# Patient Record
Sex: FEMALE | Race: BLACK OR AFRICAN AMERICAN | NOT HISPANIC OR LATINO | Employment: OTHER | URBAN - METROPOLITAN AREA
[De-identification: names, ages, dates, MRNs, and addresses within clinical notes are randomized per-mention and may not be internally consistent; named-entity substitution may affect disease eponyms.]

---

## 2017-01-09 ENCOUNTER — HOSPITAL ENCOUNTER (EMERGENCY)
Facility: CLINIC | Age: 42
Discharge: HOME | End: 2017-01-09
Attending: EMERGENCY MEDICINE | Admitting: EMERGENCY MEDICINE
Payer: COMMERCIAL

## 2017-01-09 ENCOUNTER — APPOINTMENT (OUTPATIENT)
Dept: GENERAL RADIOLOGY | Facility: CLINIC | Age: 42
End: 2017-01-09
Attending: EMERGENCY MEDICINE
Payer: COMMERCIAL

## 2017-01-09 VITALS
OXYGEN SATURATION: 100 % | TEMPERATURE: 98.1 F | DIASTOLIC BLOOD PRESSURE: 90 MMHG | SYSTOLIC BLOOD PRESSURE: 120 MMHG | RESPIRATION RATE: 18 BRPM | WEIGHT: 223 LBS | BODY MASS INDEX: 35.46 KG/M2

## 2017-01-09 DIAGNOSIS — M79.671 RIGHT FOOT PAIN: ICD-10-CM

## 2017-01-09 DIAGNOSIS — M54.50 MIDLINE LOW BACK PAIN WITHOUT SCIATICA, UNSPECIFIED CHRONICITY: ICD-10-CM

## 2017-01-09 PROCEDURE — 73630 X-RAY EXAM OF FOOT: CPT | Mod: RT

## 2017-01-09 PROCEDURE — 99284 EMERGENCY DEPT VISIT MOD MDM: CPT | Mod: Z6 | Performed by: EMERGENCY MEDICINE

## 2017-01-09 PROCEDURE — 99283 EMERGENCY DEPT VISIT LOW MDM: CPT | Performed by: EMERGENCY MEDICINE

## 2017-01-09 RX ORDER — SALSALATE 500 MG/1
500 TABLET, FILM COATED ORAL EVERY 8 HOURS
Qty: 30 TABLET | Refills: 0 | Status: SHIPPED | OUTPATIENT
Start: 2017-01-09 | End: 2017-09-29

## 2017-01-09 ASSESSMENT — ENCOUNTER SYMPTOMS
ABDOMINAL PAIN: 0
COLOR CHANGE: 0
BACK PAIN: 1
CONFUSION: 0
SHORTNESS OF BREATH: 0
DIFFICULTY URINATING: 0
EYE REDNESS: 0
ARTHRALGIAS: 0
FEVER: 0
HEADACHES: 0
NECK STIFFNESS: 0

## 2017-01-09 NOTE — ED AVS SNAPSHOT
[facility], Emergency Department    [address]    [facility] [address]    Phone:  [phone]    Fax:  [phone]                                       Sadi Bedoya   MRN: [id number]    Department:  [facility], [place], Emergency Department   Date of Visit:  1/9/2017           Patient Information     Date Of Birth          1975        Your diagnoses for this visit were:     Midline low back pain without sciatica, unspecified chronicity     Right foot pain        You were seen by Satinder Hunt MD.      Follow-up Information     Follow up with Emmie King MD.    Specialty:  Family Practice    Contact information:    [address] [id number] [phone]          Discharge Instructions         Neck/Back Pain: General    Both neck and back pain are usually caused by injury to the muscles or ligaments of the spine. Sometimes the disks that separate each bone of the spine may cause pain by pressing on a nearby nerve. Back and neck pain may appear after a sudden twisting/bending force (such as in a car accident), or sometimes after a simple awkward movement. In either case, muscle spasm is often present and adds to the pain.  Acute neck and back pain usually gets better in 1 to 2 weeks. Pain related to disk disease, arthritis in the spinal joints or spinal stenosis (narrowing of the spinal canal) can become chronic and last for months or years.  Back and neck pain are common problems. Most people feel better in 1 or 2 weeks, and most of the rest in 1 to 2 months. Most people can remain active.  Symptoms  People experience and describe pain differently.    Pain can be sharp, stabbing, shooting, aching, cramping, or burning    Movement, standing, bending, lifting, sitting, or walking may worsen the pain    Pain can be localized to one spot or area, or it can be more generalized    Pain can spread or radiate upwards, downwards, to the front, or go  "down your arms    Muscle spasm may occur.  Cause  Most of the time \"mechanical problems\" with the muscles or spine cause the pain. it is usually caused by an injury, whether known or not, to the muscles or ligaments. While illnesses can cause back pain, it is usually not caused by a serious illness. Pain is usually related to physical activity, whether sports, exercise, work, or normal activity. Sometimes it can occur without an identifiable cause. This can happen simply by stretching or moving wrong, without noting pain at the time. Other causes include:    Overexertion, lifting, pushing, pulling incorrectly or too aggressively.    Sudden twisting, bending or stretching from an accident (car or fall), or accidental movement.    Poor posture    Poor conditioning, lack of regular exercise    Spinal disc disease or arthritis    Stress    Pregnancy, or illness like appendicitis, bladder or kidney infection, pelvic infections   Home care    For neck pain: Use a comfortable pillow that supports the head and keeps the spine in a neutral position. The position of the head should not be tilted forward or backward.    When in bed, try to find a position of comfort. A firm mattress is best. Try lying flat on your back with pillows under your knees. You can also try lying on your side with your knees bent up towards your chest and a pillow between your knees.    At first, do not try to stretch out the sore spots. If there is a strain, it is not like the good soreness you get after exercising without an injury. In this case, stretching may make it worse.    Avoid prolonged sitting, long car rides or travel. This puts more stress on the lower back than standing or walking.    During the first 24 to 72 hours after an injury, apply an ice pack to the painful area for 20 minutes and then remove it for 20 minutes over a period of 60 to 90 minutes or several times a day. As a safety precaution, do not use a heating pad at bedtime. " Sleeping with a heating pad can lead to skin burns or tissue damage.    Ice and heat therapies can be alternated. Talk with your health care provider about the best treatment for your back or neck pain.    Therapeutic massage can help relax the back and neck muscles without stretching them.    Be aware of safe lifting methods and do not lift anything over 15 pounds until all the pain is gone.  Medications  Talk to your health care provider before using medications, especially if you have other medical problems or are taking other medicines.    You may use acetaminophen (such as Tylenol) or ibuprofen (such as Advil or Motrin) to control pain, unless another pain medicine was prescribed. If you have chronic conditions like diabetes, liver or kidney disease, stomach ulcers,  gastrointestinal bleeding, or are taking blood thinner medications.    Be careful if you are given pain medicines, narcotics, or medication for muscle spasm. They can cause drowsiness, and can affect your coordination, reflexes, and judgment. Do not drive or operate heavy machinery.  Follow-up care  Follow up with your health care provider if your symptoms do not start to improve after one week. Physical therapy or further tests may be needed.  If X-rays were taken, they will be reviewed by a radiologist. You will be notified of any new findings that may affect your care.  Call 911  Seek emergency medical care if any of the following occur:    Trouble breathing    Confusion    Very drowsy or trouble awakening    Fainting or loss of consciousness    Rapid or very slow heart rate    Loss of bowel or bladder control  When to seek medical care  Get prompt medical attention if any of the following occur:    Pain becomes worse or spreads into your arms or legs    Weakness, numbness or pain in one or both arms or legs    Numbness in the groin area    Difficulty walking    Fever of 100.4 F (38 C) or higher, or as directed by your healthcare provider     0553-0610 [employer]. [address] [place] [address], [place] [address]. All rights reserved. This information is not intended as a substitute for professional medical care. Always follow your healthcare professional's instructions.      Use metatarsal pads in your widest pair of shoes    24 Hour Appointment Hotline       To make an appointment at any [facility], call [phone] ([phone]). If you don't have a family doctor or clinic, we will help you find one. [facility] are conveniently located to serve the needs of you and your family.             Review of your medicines      Our records show that you are taking the medicines listed below. If these are incorrect, please call your family doctor or clinic.        Dose / Directions Last dose taken    albuterol 108 (90 BASE) MCG/ACT Inhaler   Commonly known as:  albuterol   Dose:  2 puff   Quantity:  1 Inhaler        Inhale 2 puffs into the lungs every 4 hours as needed for shortness of breath / dyspnea   Refills:  0        Benzocaine 4 MG Lozg   Dose:  1 lozenge   Quantity:  20 lozenge        Take 1 lozenge by mouth 4 times daily as needed   Refills:  0        letrozole 2.5 MG tablet   Commonly known as:  FEMARA   Quantity:  10 tablet        Take 2 tablets by mouth days 3-7 of cycle   Refills:  3        moxifloxacin 400 MG tablet   Commonly known as:  AVELOX   Dose:  400 mg   Quantity:  7 tablet        Take 1 tablet (400 mg) by mouth daily   Refills:  0        * order for DME   Quantity:  2 Device        Equipment being ordered: TEDS stockings, thigh length.   Refills:  0        * order for DME   Quantity:  1 Device        Equipment being ordered: TEDs stockings thigh high length.   Refills:  1        * order for DME   Quantity:  1 Device        Equipment being ordered: EMIR stockings thigh high.   Refills:  1        * order for DME   Quantity:  2 Device        Equipment being ordered: EMIR stockings. Thigh high.   Refills:  1        *  "order for DME   Quantity:  1 Package        Equipment being ordered: compression stockings, bilateral   Refills:  1        * order for DME   Quantity:  1 Units        Thigh high graduated compression stockings with hip attachment. Class 2.   Refills:  3        prenatal multivitamin  plus iron 27-0.8 MG Tabs per tablet   Dose:  1 tablet   Quantity:  100 tablet        Take 1 tablet by mouth daily   Refills:  3        TYLENOL PO        Refills:  0        vitamin D 38695 UNIT capsule   Commonly known as:  ERGOCALCIFEROL   Dose:  89495 Units        Take 50,000 Units by mouth daily   Refills:  0        * Notice:  This list has 6 medication(s) that are the same as other medications prescribed for you. Read the directions carefully, and ask your doctor or other care provider to review them with you.            Procedures and tests performed during your visit     Foot  XR, G/E 3 views, right      Orders Needing Specimen Collection     None      Pending Results     No orders found from 1/8/2017 to 1/10/2017.            Pending Culture Results     No orders found from 1/8/2017 to 1/10/2017.            Thank you for choosing [facility]       Thank you for choosing [facility] for your care. Our goal is always to provide you with excellent care. Hearing back from our patients is one way we can continue to improve our services. Please take a few minutes to complete the written survey that you may receive in the mail after you visit with us. Thank you!        "[employer]"hart Information     [facility] lets you send messages to your doctor, view your test results, renew your prescriptions, schedule appointments and more. To sign up, go to [email or web address] . Click on \"Log in\" on the left side of the screen, which will take you to the Welcome page. Then click on \"Sign up Now\" on the right side of the page.     You will be asked to enter the access code listed below, as well as some personal information. Please follow the directions to create " your username and password.     Your access code is: [email or web address]  Expires: 2017 11:34 AM     Your access code will  in 90 days. If you need help or a new code, please call your [place] clinic or [phone].        Care EveryWhere ID     This is your Care EveryWhere ID. This could be used by other organizations to access your [place] medical records  [id number]        After Visit Summary       This is your record. Keep this with you and show to your community pharmacist(s) and doctor(s) at your next visit.

## 2017-01-09 NOTE — DISCHARGE INSTRUCTIONS
"  Neck/Back Pain: General    Both neck and back pain are usually caused by injury to the muscles or ligaments of the spine. Sometimes the disks that separate each bone of the spine may cause pain by pressing on a nearby nerve. Back and neck pain may appear after a sudden twisting/bending force (such as in a car accident), or sometimes after a simple awkward movement. In either case, muscle spasm is often present and adds to the pain.  Acute neck and back pain usually gets better in 1 to 2 weeks. Pain related to disk disease, arthritis in the spinal joints or spinal stenosis (narrowing of the spinal canal) can become chronic and last for months or years.  Back and neck pain are common problems. Most people feel better in 1 or 2 weeks, and most of the rest in 1 to 2 months. Most people can remain active.  Symptoms  People experience and describe pain differently.    Pain can be sharp, stabbing, shooting, aching, cramping, or burning    Movement, standing, bending, lifting, sitting, or walking may worsen the pain    Pain can be localized to one spot or area, or it can be more generalized    Pain can spread or radiate upwards, downwards, to the front, or go down your arms    Muscle spasm may occur.  Cause  Most of the time \"mechanical problems\" with the muscles or spine cause the pain. it is usually caused by an injury, whether known or not, to the muscles or ligaments. While illnesses can cause back pain, it is usually not caused by a serious illness. Pain is usually related to physical activity, whether sports, exercise, work, or normal activity. Sometimes it can occur without an identifiable cause. This can happen simply by stretching or moving wrong, without noting pain at the time. Other causes include:    Overexertion, lifting, pushing, pulling incorrectly or too aggressively.    Sudden twisting, bending or stretching from an accident (car or fall), or accidental movement.    Poor posture    Poor conditioning, " lack of regular exercise    Spinal disc disease or arthritis    Stress    Pregnancy, or illness like appendicitis, bladder or kidney infection, pelvic infections   Home care    For neck pain: Use a comfortable pillow that supports the head and keeps the spine in a neutral position. The position of the head should not be tilted forward or backward.    When in bed, try to find a position of comfort. A firm mattress is best. Try lying flat on your back with pillows under your knees. You can also try lying on your side with your knees bent up towards your chest and a pillow between your knees.    At first, do not try to stretch out the sore spots. If there is a strain, it is not like the good soreness you get after exercising without an injury. In this case, stretching may make it worse.    Avoid prolonged sitting, long car rides or travel. This puts more stress on the lower back than standing or walking.    During the first 24 to 72 hours after an injury, apply an ice pack to the painful area for 20 minutes and then remove it for 20 minutes over a period of 60 to 90 minutes or several times a day. As a safety precaution, do not use a heating pad at bedtime. Sleeping with a heating pad can lead to skin burns or tissue damage.    Ice and heat therapies can be alternated. Talk with your health care provider about the best treatment for your back or neck pain.    Therapeutic massage can help relax the back and neck muscles without stretching them.    Be aware of safe lifting methods and do not lift anything over 15 pounds until all the pain is gone.  Medications  Talk to your health care provider before using medications, especially if you have other medical problems or are taking other medicines.    You may use acetaminophen (such as Tylenol) or ibuprofen (such as Advil or Motrin) to control pain, unless another pain medicine was prescribed. If you have chronic conditions like diabetes, liver or kidney disease, stomach  ulcers,  gastrointestinal bleeding, or are taking blood thinner medications.    Be careful if you are given pain medicines, narcotics, or medication for muscle spasm. They can cause drowsiness, and can affect your coordination, reflexes, and judgment. Do not drive or operate heavy machinery.  Follow-up care  Follow up with your health care provider if your symptoms do not start to improve after one week. Physical therapy or further tests may be needed.  If X-rays were taken, they will be reviewed by a radiologist. You will be notified of any new findings that may affect your care.  Call 911  Seek emergency medical care if any of the following occur:    Trouble breathing    Confusion    Very drowsy or trouble awakening    Fainting or loss of consciousness    Rapid or very slow heart rate    Loss of bowel or bladder control  When to seek medical care  Get prompt medical attention if any of the following occur:    Pain becomes worse or spreads into your arms or legs    Weakness, numbness or pain in one or both arms or legs    Numbness in the groin area    Difficulty walking    Fever of 100.4 F (38 C) or higher, or as directed by your healthcare provider    8293-1762 [employer]. [address] [place] [address]. All rights reserved. This information is not intended as a substitute for professional medical care. Always follow your healthcare professional's instructions.      Use metatarsal pads in your widest pair of shoes

## 2017-01-09 NOTE — ED AVS SNAPSHOT
[facility], [place], Emergency Department    [address]    [facility] [place] [address]    Phone:  [phone]    Fax:  [phone]                                       Sadi Bedoya   MRN: [id number]    Department:  [facility], Emergency Department   Date of Visit:  1/9/2017           After Visit Summary Signature Page     I have received my discharge instructions, and my questions have been answered. I have discussed any challenges I see with this plan with the nurse or doctor.    ..........................................................................................................................................  Patient/Patient Representative Signature      ..........................................................................................................................................  Patient Representative Print Name and Relationship to Patient    ..................................................               ................................................  Date                                            Time    ..........................................................................................................................................  Reviewed by Signature/Title    ...................................................              ..............................................  Date                                                            Time

## 2017-01-09 NOTE — ED PROVIDER NOTES
History     Chief Complaint   Patient presents with     Back Pain     mid back pain x 3 years, getting worse the last 3 weeks     Foot Pain     R top of foot pain x 1 week, denies trauma     The history is provided by the patient. The history is limited by a language barrier. A  was used ([place]).     Sadi Bedoya is a 42 year old female who presents to the Emergency Department with right foot pain and back pain that prevents her from sleeping. The patient reports that she has had back pain for approximately 3 years and states that she has had the dorsal foot pain for a week. She denies any injury or trauma. No redness on her foot. In regards to her back, she reports midline back pain and states that she was told she was losing cartilage between the bones. Patient states that she has tried Tylenol for her symptoms.     She reports that she would be unable to see her primary care doctor immediately which is why she came into the ER.     I have reviewed the Medications, Allergies, Past Medical and Surgical History, and Social History in the [facility] system.    PAST MEDICAL HISTORY  History reviewed. No pertinent past medical history.  PAST SURGICAL HISTORY  Past Surgical History   Procedure Laterality Date     Cholecystectomy       FAMILY HISTORY  Family History   Problem Relation Age of Onset     Liver Disease Mother      SOCIAL HISTORY  Social History   Substance Use Topics     Smoking status: Never Smoker      Smokeless tobacco: Never Used     Alcohol Use: No     MEDICATIONS  No current facility-administered medications for this encounter.     Current Outpatient Prescriptions   Medication     salsalate (DISALCID) 500 MG tablet     Acetaminophen (TYLENOL PO)     Prenatal Vit-Fe Fumarate-FA (PRENATAL MULTIVITAMIN  PLUS IRON) 27-0.8 MG TABS     vitamin D (ERGOCALCIFEROL) 35107 UNIT capsule     letrozole (FEMARA) 2.5 MG tablet     order for DME     order for DME     albuterol (ALBUTEROL) 108 (90  BASE) MCG/ACT inhaler     moxifloxacin (AVELOX) 400 MG tablet     Benzocaine 4 MG LOZG     ORDER FOR DME     ORDER FOR DME     ORDER FOR DME     ORDER FOR DME     ALLERGIES  Allergies   Allergen Reactions     No Known Drug Allergy         Review of Systems   Constitutional: Negative for fever.   HENT: Negative for congestion.    Eyes: Negative for redness.   Respiratory: Negative for shortness of breath.    Cardiovascular: Negative for chest pain.   Gastrointestinal: Negative for abdominal pain.   Genitourinary: Negative for difficulty urinating.   Musculoskeletal: Positive for back pain. Negative for arthralgias and neck stiffness.        Right foot pain   Skin: Negative for color change.   Neurological: Negative for headaches.   Psychiatric/Behavioral: Negative for confusion.   All other systems reviewed and are negative.      Physical Exam      Physical Exam   Constitutional: No distress.   HENT:   Head: Atraumatic.   Mouth/Throat: Oropharynx is clear and moist. No oropharyngeal exudate.   Eyes: Pupils are equal, round, and reactive to light. No scleral icterus.   Cardiovascular: Normal heart sounds and intact distal pulses.    Pulmonary/Chest: Breath sounds normal. No respiratory distress.   Abdominal: Soft. Bowel sounds are normal. There is no tenderness.   Musculoskeletal: She exhibits no edema.        Thoracic back: She exhibits tenderness.        Lumbar back: She exhibits tenderness and pain. She exhibits no swelling.        Back:         Feet:    Skin: Skin is warm. No rash noted. She is not diaphoretic.   Nursing note and vitals reviewed.      ED Course   Procedures        Results for orders placed or performed during the hospital encounter of 01/09/17 (from the past 24 hour(s))   Foot  XR, G/E 3 views, right    Narrative    XR FOOT RT G/E 3 VW 1/9/2017 1:34 PM    HISTORY: Metatarsal pain.    COMPARISON: None.    FINDINGS: No evidence of stress fracture or other specific osseous  abnormality to explain  pain in the metatarsals. There is very mild  degenerative change at the first MTP joint which is essentially within  normal limits for age. Small plantar heel spur as well.      Impression    IMPRESSION: No specific finding to explain metatarsal pain.    MACIE DIAZ MD             Labs Ordered and Resulted from Time of ED Arrival Up to the Time of Departure from the ED - No data to display    Assessments & Plan (with Medical Decision Making)   42-year-old female presents for evaluation of three-year history of low back pain as well as 2-4 week history of right foot pain.  She has tenderness in the bilateral paraspinous musculature of her lumbar back. She has no evidence of red flag symptoms such as weakness, paresthesias, incontinence or other.  Her exam is otherwise unremarkable. Her right foot reveals tenderness over the dorsum predominantly of the 2nd through 4th metatarsals without swelling. X-ray was obtained which was normal.  I suspect this is metatarsalgia and they have recommended metatarsal pads and  Wide fitting shoes as well as follow-up by primary physician.  I have reviewed the nursing notes.    I have reviewed the findings, diagnosis, plan and need for follow up with the patient.    New Prescriptions    SALSALATE (DISALCID) 500 MG TABLET    Take 1 tablet (500 mg) by mouth every 8 hours       Final diagnoses:   Midline low back pain without sciatica, unspecified chronicity   Right foot pain   I, Layo Díaz, am serving as a trained medical scribe to document services personally performed by Ja Hunt MD, based on the provider's statements to me.      Ja BATISTA MD, was physically present and have reviewed and verified the accuracy of this note documented by Layo Díaz.      1/9/2017   [facility], EMERGENCY DEPARTMENT      Macie Hunt MD  01/09/17 [id number]

## 2017-09-03 ENCOUNTER — HEALTH MAINTENANCE LETTER (OUTPATIENT)
Age: 42
End: 2017-09-03

## 2017-09-22 ENCOUNTER — TELEPHONE (OUTPATIENT)
Dept: FAMILY MEDICINE | Facility: CLINIC | Age: 42
End: 2017-09-22

## 2017-09-22 NOTE — TELEPHONE ENCOUNTER
Panel Management Review      Patient has the following on her problem list: None      Composite cancer screening  Chart review shows that this patient is due/due soon for the following Pap Smear  Summary:    Patient is due/failing the following:   PAP    Action needed:   Patient needs office visit for PAP and physical exam.    Type of outreach:    Call and schedule appointment.     Questions for provider review:                                                                                                                                      Emmie King MD.   Family Physician.  [facility].          Chart routed to Care Team .

## 2017-09-29 ENCOUNTER — HOSPITAL ENCOUNTER (EMERGENCY)
Facility: CLINIC | Age: 42
Discharge: HOME | End: 2017-09-30
Attending: EMERGENCY MEDICINE | Admitting: EMERGENCY MEDICINE
Payer: MEDICAID

## 2017-09-29 DIAGNOSIS — R42 DIZZINESS: ICD-10-CM

## 2017-09-29 PROCEDURE — 99284 EMERGENCY DEPT VISIT MOD MDM: CPT | Mod: Z6 | Performed by: EMERGENCY MEDICINE

## 2017-09-29 PROCEDURE — 99283 EMERGENCY DEPT VISIT LOW MDM: CPT | Performed by: EMERGENCY MEDICINE

## 2017-09-29 NOTE — ED AVS SNAPSHOT
[facility], [place], Emergency Department    [address]    [facility] [place] [address]    Phone:  [phone]    Fax:  [phone]                                       Sadi Bedoya   MRN: [id number]    Department:  [facility], Emergency Department   Date of Visit:  9/29/2017           After Visit Summary Signature Page     I have received my discharge instructions, and my questions have been answered. I have discussed any challenges I see with this plan with the nurse or doctor.    ..........................................................................................................................................  Patient/Patient Representative Signature      ..........................................................................................................................................  Patient Representative Print Name and Relationship to Patient    ..................................................               ................................................  Date                                            Time    ..........................................................................................................................................  Reviewed by Signature/Title    ...................................................              ..............................................  Date                                                            Time

## 2017-09-29 NOTE — ED AVS SNAPSHOT
[facility], Emergency Department    [address]    [facility] [address]    Phone:  [phone]    Fax:  [phone]                                       Sadi Bedoya   MRN: [id number]    Department:  [facility], Emergency Department   Date of Visit:  9/29/2017           Patient Information     Date Of Birth          1975        Your diagnoses for this visit were:     Dizziness        You were seen by Paul aPrker MD.      Follow-up Information     Follow up with Emmie King MD.    Specialty:  Family Practice    Contact information:    [address] [id number] [phone]          Discharge Instructions       Rest.  Drink plenty of liquids.  Take medication as needed for dizziness.  Follow up in clinic this week.  Return if persistent symptoms.    24 Hour Appointment Hotline       To make an appointment at any [facility] clinic, call [phone] ([phone]). If you don't have a family doctor or clinic, we will help you find one. [facility] clinics are conveniently located to serve the needs of you and your family.             Review of your medicines      START taking        Dose / Directions Last dose taken    meclizine 25 MG tablet   Commonly known as:  ANTIVERT   Dose:  25 mg   Quantity:  40 tablet        Take 1 tablet (25 mg) by mouth 3 times daily as needed for dizziness   Refills:  0          Our records show that you are taking the medicines listed below. If these are incorrect, please call your family doctor or clinic.        Dose / Directions Last dose taken    * order for DME   Quantity:  2 Device        Equipment being ordered: TEDS stockings, thigh length.   Refills:  0        * order for DME   Quantity:  1 Device        Equipment being ordered: TEDs stockings thigh high length.   Refills:  1        * order for DME   Quantity:  1 Device        Equipment being ordered: EMIR stockings thigh high.   Refills:  1        * order for DME    Quantity:  2 Device        Equipment being ordered: EMIR stockings. Thigh high.   Refills:  1        * order for DME   Quantity:  1 Package        Equipment being ordered: compression stockings, bilateral   Refills:  1        * order for DME   Quantity:  1 Units        Thigh high graduated compression stockings with hip attachment. Class 2.   Refills:  3        prenatal multivitamin plus iron 27-0.8 MG Tabs per tablet   Dose:  1 tablet   Quantity:  100 tablet        Take 1 tablet by mouth daily   Refills:  3        TYLENOL PO        Refills:  0        vitamin D 27624 UNIT capsule   Commonly known as:  ERGOCALCIFEROL   Dose:  11170 Units        Take 50,000 Units by mouth daily   Refills:  0        * Notice:  This list has 6 medication(s) that are the same as other medications prescribed for you. Read the directions carefully, and ask your doctor or other care provider to review them with you.            Prescriptions were sent or printed at these locations (1 Prescription)                   Other Prescriptions                Printed at Department/Unit printer (1 of 1)         meclizine (ANTIVERT) 25 MG tablet                Procedures and tests performed during your visit     Glucose by meter    Glucose monitor nursing POCT    HCG qualitative urine    UA with Microscopic reflex to Culture      Orders Needing Specimen Collection     None      Pending Results     No orders found for last 3 day(s).            Pending Culture Results     No orders found for last 3 day(s).            Pending Results Instructions     If you had any lab results that were not finalized at the time of your Discharge, you can call the ED Lab Result RN at [phone]. You will be contacted by this team for any positive Lab results or changes in treatment. The nurses are available 7 days a week from 10A to 6:30P.  You can leave a message 24 hours per day and they will return your call.        Thank you for choosing [facility]       Thank you for  "choosing [facility] for your care. Our goal is always to provide you with excellent care. Hearing back from our patients is one way we can continue to improve our services. Please take a few minutes to complete the written survey that you may receive in the mail after you visit with us. Thank you!        [employer] Information     [employer] lets you send messages to your doctor, view your test results, renew your prescriptions, schedule appointments and more. To sign up, go to [email or web address] . Click on \"Log in\" on the left side of the screen, which will take you to the Welcome page. Then click on \"Sign up Now\" on the right side of the page.     You will be asked to enter the access code listed below, as well as some personal information. Please follow the directions to create your username and password.     Your access code is: [email or web address]  Expires: 2017  1:49 AM     Your access code will  in 90 days. If you need help or a new code, please call your [facility] clinic or [phone].        Care EveryWhere ID     This is your Care EveryWhere ID. This could be used by other organizations to access your [facility] medical records  [id number]        Equal Access to Services     ANABELLE COBOS : Jewel Nice, gustavo nye, qalilian olvera, jesus stevens. So [facility] [phone].    ATENCIÓN: Si habla español, tiene a rangel disposición servicios gratuitos de asistencia lingüística. Llame al [phone].    We comply with applicable federal civil rights laws and [place] laws. We do not discriminate on the basis of race, color, national origin, age, disability, sex, sexual orientation, or gender identity.            After Visit Summary       This is your record. Keep this with you and show to your community pharmacist(s) and doctor(s) at your next visit.                  "

## 2017-09-30 VITALS
OXYGEN SATURATION: 100 % | SYSTOLIC BLOOD PRESSURE: 93 MMHG | HEIGHT: 66 IN | HEART RATE: 63 BPM | TEMPERATURE: 96.6 F | RESPIRATION RATE: 18 BRPM | WEIGHT: 215.5 LBS | BODY MASS INDEX: 34.63 KG/M2 | DIASTOLIC BLOOD PRESSURE: 59 MMHG

## 2017-09-30 LAB
ALBUMIN UR-MCNC: 10 MG/DL
APPEARANCE UR: CLEAR
BILIRUB UR QL STRIP: NEGATIVE
COLOR UR AUTO: YELLOW
GLUCOSE BLDC GLUCOMTR-MCNC: 101 MG/DL (ref 70–99)
GLUCOSE UR STRIP-MCNC: NEGATIVE MG/DL
HCG UR QL: NEGATIVE
HGB UR QL STRIP: ABNORMAL
KETONES UR STRIP-MCNC: NEGATIVE MG/DL
LEUKOCYTE ESTERASE UR QL STRIP: NEGATIVE
MUCOUS THREADS #/AREA URNS LPF: PRESENT /LPF
NITRATE UR QL: NEGATIVE
PH UR STRIP: 6 PH (ref 5–7)
RBC #/AREA URNS AUTO: 3 /HPF (ref 0–2)
SOURCE: ABNORMAL
SP GR UR STRIP: 1.02 (ref 1–1.03)
SQUAMOUS #/AREA URNS AUTO: 3 /HPF (ref 0–1)
UROBILINOGEN UR STRIP-MCNC: NORMAL MG/DL (ref 0–2)
WBC #/AREA URNS AUTO: <1 /HPF (ref 0–2)

## 2017-09-30 PROCEDURE — 81001 URINALYSIS AUTO W/SCOPE: CPT | Performed by: EMERGENCY MEDICINE

## 2017-09-30 PROCEDURE — 00000146 ZZHCL STATISTIC GLUCOSE BY METER IP

## 2017-09-30 PROCEDURE — 81025 URINE PREGNANCY TEST: CPT | Performed by: EMERGENCY MEDICINE

## 2017-09-30 RX ORDER — MECLIZINE HYDROCHLORIDE 25 MG/1
25 TABLET ORAL 3 TIMES DAILY PRN
Qty: 40 TABLET | Refills: 0 | Status: SHIPPED | OUTPATIENT
Start: 2017-09-30 | End: 2017-10-26

## 2017-09-30 ASSESSMENT — ENCOUNTER SYMPTOMS
COUGH: 0
NUMBNESS: 0
TROUBLE SWALLOWING: 0
SHORTNESS OF BREATH: 0
RHINORRHEA: 0
HEMATURIA: 0
LIGHT-HEADEDNESS: 1
FEVER: 0
DYSURIA: 0
DIARRHEA: 0
ABDOMINAL PAIN: 0
CHILLS: 0
HEADACHES: 0
DIZZINESS: 1
CONFUSION: 0
WEAKNESS: 0
VOMITING: 0
SORE THROAT: 0

## 2017-09-30 NOTE — ED PROVIDER NOTES
History     Chief Complaint   Patient presents with     Dizziness     Dizziness and increased thirst for the last 2 days.      HPI  Sadi Bedoya is a 42 year old female with a history of bipolar 1 disorder who presents for evaluation of dizziness. The patient reports that over the past 2 days she has felt dizzy. She describes dizziness is worse with standing and certain movements. She has sometimes felt like she might pass out but mostly reports definite sensation of movement and spinning. The patient also comes in with complaint of feeling quite thirsty in this time. She notes that she's also missed her menstrual period 3 weeks ago. No history of diabetes, by her report. She denies any other symptoms or complaints, including any fevers, chills, cough, sore throat, difficulty swallowing, shortness of breath, chest pain, abdominal pain, vomiting, or diarrhea.  No headache. No dysphagia, dysarthria, or diplopia. No sensory or motor symptoms. Onset sudden and not progressive. No confusion or other mental status changes. No palpitations. No hearing loss or tinnitus. Denies similar symptoms in past. Denies drug or alcohol use. No ataxia. No falls or trauma.   No current facility-administered medications for this encounter.      Current Outpatient Prescriptions   Medication     meclizine (ANTIVERT) 25 MG tablet     order for DME     order for DME     ORDER FOR DME     ORDER FOR DME     ORDER FOR DME     ORDER FOR DME     Prenatal Vit-Fe Fumarate-FA (PRENATAL MULTIVITAMIN  PLUS IRON) 27-0.8 MG TABS     vitamin D (ERGOCALCIFEROL) 57991 UNIT capsule     Acetaminophen (TYLENOL PO)     History reviewed. No pertinent past medical history.    Past Surgical History:   Procedure Laterality Date     CHOLECYSTECTOMY         Family History   Problem Relation Age of Onset     Liver Disease Mother        Social History   Substance Use Topics     Smoking status: Never Smoker     Smokeless tobacco: Never Used     Alcohol use No  "       Allergies   Allergen Reactions     No Known Drug Allergy        I have reviewed the Medications, Allergies, Past Medical and Surgical History, and Social History in the Epic system.    Review of Systems   Constitutional: Negative for chills and fever.   HENT: Negative for congestion, rhinorrhea, sore throat and trouble swallowing.    Eyes: Positive for visual disturbance. Negative for pain.   Respiratory: Negative for cough, chest tightness and shortness of breath.    Cardiovascular: Negative for chest pain, palpitations and leg swelling.   Gastrointestinal: Negative for abdominal pain, diarrhea and vomiting.   Genitourinary: Negative for dysuria, hematuria, vaginal bleeding and vaginal discharge.   Musculoskeletal: Negative for arthralgias, gait problem, myalgias and neck pain.   Skin: Negative for rash.   Allergic/Immunologic: Negative for immunocompromised state.   Neurological: Positive for dizziness and light-headedness. Negative for seizures, syncope, facial asymmetry, weakness, numbness and headaches.   Hematological: Does not bruise/bleed easily.   Psychiatric/Behavioral: Negative for confusion and hallucinations. The patient is nervous/anxious.    All other systems reviewed and are negative.      Physical Exam   BP: 97/54  Heart Rate: 62  Temp: 96.6  F (35.9  C)  Resp: 16  Height: 167.6 cm (5' 6\")  Weight: 97.8 kg (215 lb 8 oz)  SpO2: 100 %  Physical Exam   Constitutional: She appears well-developed and well-nourished. No distress.   HENT:   Head: Normocephalic and atraumatic.   Mouth/Throat: Oropharynx is clear and moist.   Eyes: Conjunctivae and EOM are normal.   Horizontal nystagmus present   Neck: Normal range of motion. Neck supple.   Cardiovascular: Normal rate, regular rhythm, normal heart sounds and intact distal pulses.    Pulmonary/Chest: Effort normal and breath sounds normal. No respiratory distress.   Abdominal: Soft. There is no tenderness.   Musculoskeletal: Normal range of motion. She " exhibits no edema or tenderness.   Neurological: She is alert. She has normal strength and normal reflexes. No cranial nerve deficit or sensory deficit. Coordination and gait normal. She displays no Babinski's sign on the right side. She displays no Babinski's sign on the left side.   Normal and non-focal neurologic exam.   Skin: Skin is warm and dry.   Psychiatric: Her speech is normal and behavior is normal. Thought content normal. Her mood appears anxious.   Nursing note and vitals reviewed.      ED Course     ED Course     Procedures             Critical Care time:  none             Labs Ordered and Resulted from Time of ED Arrival Up to the Time of Departure from the ED   ROUTINE UA WITH MICROSCOPIC REFLEX TO CULTURE - Abnormal; Notable for the following:        Result Value    Blood Urine Trace (*)     Protein Albumin Urine 10 (*)     RBC Urine 3 (*)     Squamous Epithelial /HPF Urine 3 (*)     Mucous Urine Present (*)     All other components within normal limits   GLUCOSE BY METER - Abnormal; Notable for the following:     Glucose 101 (*)     All other components within normal limits   GLUCOSE MONITOR NURSING POCT   HCG QUALITATIVE URINE     Medications - No data to display         Assessments & Plan (with Medical Decision Making)   Vertigo. Findings suggest peripheral etiology. No evidence of central cause of vertigo. Hemodynamically stable. No syncope. No evidence of hypovolemia or dysrhythmia.  Will treat with meclizine and have patient follow up in clinic. Advised against any potentially dangerous activities including driving until problem is resolved.    I have reviewed the nursing notes.    I have reviewed the findings, diagnosis, plan and need for follow up with the patient.    Discharge Medication List as of 9/30/2017  1:49 AM      START taking these medications    Details   meclizine (ANTIVERT) 25 MG tablet Take 1 tablet (25 mg) by mouth 3 times daily as needed for dizziness, Disp-40 tablet, R-0,  Local Print             Final diagnoses:   Dizziness     I, Yoav Astudillo, am serving as a trained medical scribe to document services personally performed by Paul Parker MD, based on the provider's statements to me.      Paul BATISTA MD, was physically present and have reviewed and verified the accuracy of this note documented by Yoav Astudillo.    9/29/2017   [facility], EMERGENCY DEPARTMENT     Paul Parker MD  10/20/17 0533

## 2017-09-30 NOTE — DISCHARGE INSTRUCTIONS
Rest.  Drink plenty of liquids.  Take medication as needed for dizziness.  Follow up in clinic this week.  Return if persistent symptoms.

## 2017-10-20 ASSESSMENT — ENCOUNTER SYMPTOMS
NERVOUS/ANXIOUS: 1
HALLUCINATIONS: 0
EYE PAIN: 0
PALPITATIONS: 0
FACIAL ASYMMETRY: 0
MYALGIAS: 0
CHEST TIGHTNESS: 0
ARTHRALGIAS: 0
NECK PAIN: 0
BRUISES/BLEEDS EASILY: 0
SEIZURES: 0

## 2017-10-24 ENCOUNTER — OFFICE VISIT (OUTPATIENT)
Dept: FAMILY MEDICINE | Facility: CLINIC | Age: 42
End: 2017-10-24
Payer: MEDICAID

## 2017-10-24 VITALS
HEART RATE: 65 BPM | DIASTOLIC BLOOD PRESSURE: 60 MMHG | SYSTOLIC BLOOD PRESSURE: 90 MMHG | BODY MASS INDEX: 35.19 KG/M2 | OXYGEN SATURATION: 100 % | WEIGHT: 218 LBS | TEMPERATURE: 98 F

## 2017-10-24 DIAGNOSIS — Z13.1 SCREENING FOR DIABETES MELLITUS: ICD-10-CM

## 2017-10-24 DIAGNOSIS — E55.9 VITAMIN D DEFICIENCY: ICD-10-CM

## 2017-10-24 DIAGNOSIS — M79.10 MYALGIA: ICD-10-CM

## 2017-10-24 DIAGNOSIS — Z13.220 SCREENING FOR LIPID DISORDERS: ICD-10-CM

## 2017-10-24 DIAGNOSIS — N97.9 SECONDARY FEMALE INFERTILITY: ICD-10-CM

## 2017-10-24 DIAGNOSIS — R11.0 NAUSEA: ICD-10-CM

## 2017-10-24 DIAGNOSIS — Z00.00 ROUTINE GENERAL MEDICAL EXAMINATION AT A HEALTH CARE FACILITY: Primary | ICD-10-CM

## 2017-10-24 DIAGNOSIS — R30.0 DYSURIA: ICD-10-CM

## 2017-10-24 LAB
ALBUMIN UR-MCNC: NEGATIVE MG/DL
APPEARANCE UR: CLEAR
BACTERIA #/AREA URNS HPF: ABNORMAL /HPF
BASOPHILS # BLD AUTO: 0 10E9/L (ref 0–0.2)
BASOPHILS NFR BLD AUTO: 0.6 %
BETA HCG QUAL IFA URINE: NEGATIVE
BILIRUB UR QL STRIP: NEGATIVE
COLOR UR AUTO: YELLOW
DIFFERENTIAL METHOD BLD: NORMAL
EOSINOPHIL # BLD AUTO: 0.1 10E9/L (ref 0–0.7)
EOSINOPHIL NFR BLD AUTO: 1.5 %
ERYTHROCYTE [DISTWIDTH] IN BLOOD BY AUTOMATED COUNT: 11.6 % (ref 10–15)
ERYTHROCYTE [SEDIMENTATION RATE] IN BLOOD BY WESTERGREN METHOD: 41 MM/H (ref 0–20)
GLUCOSE UR STRIP-MCNC: NEGATIVE MG/DL
HCT VFR BLD AUTO: 39.6 % (ref 35–47)
HGB BLD-MCNC: 13.4 G/DL (ref 11.7–15.7)
HGB UR QL STRIP: ABNORMAL
KETONES UR STRIP-MCNC: NEGATIVE MG/DL
LEUKOCYTE ESTERASE UR QL STRIP: NEGATIVE
LYMPHOCYTES # BLD AUTO: 1.6 10E9/L (ref 0.8–5.3)
LYMPHOCYTES NFR BLD AUTO: 32.8 %
MCH RBC QN AUTO: 32.2 PG (ref 26.5–33)
MCHC RBC AUTO-ENTMCNC: 33.8 G/DL (ref 31.5–36.5)
MCV RBC AUTO: 95 FL (ref 78–100)
MONOCYTES # BLD AUTO: 0.3 10E9/L (ref 0–1.3)
MONOCYTES NFR BLD AUTO: 5.6 %
NEUTROPHILS # BLD AUTO: 2.9 10E9/L (ref 1.6–8.3)
NEUTROPHILS NFR BLD AUTO: 59.5 %
NITRATE UR QL: NEGATIVE
NON-SQ EPI CELLS #/AREA URNS LPF: ABNORMAL /LPF
PH UR STRIP: 6 PH (ref 5–7)
PLATELET # BLD AUTO: 331 10E9/L (ref 150–450)
RBC # BLD AUTO: 4.16 10E12/L (ref 3.8–5.2)
RBC #/AREA URNS AUTO: ABNORMAL /HPF
SOURCE: ABNORMAL
SP GR UR STRIP: 1.01 (ref 1–1.03)
TSH SERPL DL<=0.005 MIU/L-ACNC: 3.24 MU/L (ref 0.4–4)
UROBILINOGEN UR STRIP-ACNC: 0.2 EU/DL (ref 0.2–1)
WBC # BLD AUTO: 4.8 10E9/L (ref 4–11)
WBC #/AREA URNS AUTO: ABNORMAL /HPF

## 2017-10-24 PROCEDURE — 84703 CHORIONIC GONADOTROPIN ASSAY: CPT | Performed by: FAMILY MEDICINE

## 2017-10-24 PROCEDURE — 85652 RBC SED RATE AUTOMATED: CPT | Performed by: FAMILY MEDICINE

## 2017-10-24 PROCEDURE — 99213 OFFICE O/P EST LOW 20 MIN: CPT | Mod: 25 | Performed by: FAMILY MEDICINE

## 2017-10-24 PROCEDURE — 81001 URINALYSIS AUTO W/SCOPE: CPT | Performed by: FAMILY MEDICINE

## 2017-10-24 PROCEDURE — 82306 VITAMIN D 25 HYDROXY: CPT | Performed by: FAMILY MEDICINE

## 2017-10-24 PROCEDURE — 36415 COLL VENOUS BLD VENIPUNCTURE: CPT | Performed by: FAMILY MEDICINE

## 2017-10-24 PROCEDURE — 85025 COMPLETE CBC W/AUTO DIFF WBC: CPT | Performed by: FAMILY MEDICINE

## 2017-10-24 PROCEDURE — 84443 ASSAY THYROID STIM HORMONE: CPT | Performed by: FAMILY MEDICINE

## 2017-10-24 PROCEDURE — 99396 PREV VISIT EST AGE 40-64: CPT | Performed by: FAMILY MEDICINE

## 2017-10-24 RX ORDER — CYCLOBENZAPRINE HCL 5 MG
5 TABLET ORAL 3 TIMES DAILY PRN
Qty: 30 TABLET | Refills: 1 | Status: SHIPPED | OUTPATIENT
Start: 2017-10-24 | End: 2017-10-26

## 2017-10-24 ASSESSMENT — PAIN SCALES - GENERAL: PAINLEVEL: NO PAIN (0)

## 2017-10-24 NOTE — NURSING NOTE
"Chief Complaint   Patient presents with     Physical     PAP       Initial BP 90/60 (BP Location: Left arm, Patient Position: Chair, Cuff Size: Adult Large)  Pulse 65  Temp 98  F (36.7  C) (Oral)  Wt 218 lb (98.9 kg)  LMP 10/16/2017  SpO2 100%  BMI 35.19 kg/m2 Estimated body mass index is 35.19 kg/(m^2) as calculated from the following:    Height as of 9/29/17: 5' 6\" (1.676 m).    Weight as of this encounter: 218 lb (98.9 kg).  Medication Reconciliation: complete   Meg Estrada MA      "

## 2017-10-24 NOTE — MR AVS SNAPSHOT
After Visit Summary   10/24/2017    Sadi Bedoya    MRN: [id number]           Patient Information     Date Of Birth          1975        Visit Information        Provider Department      10/24/2017 10:00 AM Engelmann, Lauren Anneliese, MD [facility]        Today's Diagnoses     Nausea    -  1    Dysuria        Secondary female infertility        Vitamin D deficiency        Screening for diabetes mellitus        Screening for lipid disorders        Myalgia          Care Instructions      Preventive Health Recommendations  Female Ages 40 to 49    Yearly exam:     See your health care provider every year in order to  1. Review health changes.   2. Discuss preventive care.    3. Review your medicines if your doctor prescribed any.      Get a Pap test every three years (unless you have an abnormal result and your provider advises testing more often).      If you get Pap tests with HPV test, you only need to test every 5 years, unless you have an abnormal result. You do not need a Pap test if your uterus was removed (hysterectomy) and you have not had cancer.      You should be tested each year for STDs (sexually transmitted diseases), if you're at risk.       Ask your doctor if you should have a mammogram.      Have a colonoscopy (test for colon cancer) if someone in your family has had colon cancer or polyps before age 50.       Have a cholesterol test every 5 years.       Have a diabetes test (fasting glucose) after age 45. If you are at risk for diabetes, you should have this test every 3 years.    Shots: Get a flu shot each year. Get a tetanus shot every 10 years.     Nutrition:     Eat at least 5 servings of fruits and vegetables each day.    Eat whole-grain bread, whole-wheat pasta and brown rice instead of white grains and rice.    Talk to your provider about Calcium and Vitamin D.     Lifestyle    Exercise at least 150 minutes a week (an average of 30 minutes a day,  5 days a week). This will help you control your weight and prevent disease.    Limit alcohol to one drink per day.    No smoking.     Wear sunscreen to prevent skin cancer.    See your dentist every six months for an exam and cleaning.  Preventive Health Recommendations  Female Ages 40 to 49    Yearly exam:   See your health care provider every year in order to  Review health changes.   Discuss preventive care.    Review your medicines if your doctor prescribed any.    Get a Pap test every three years (unless you have an abnormal result and your provider advises testing more often).    If you get Pap tests with HPV test, you only need to test every 5 years, unless you have an abnormal result. You do not need a Pap test if your uterus was removed (hysterectomy) and you have not had cancer.    You should be tested each year for STDs (sexually transmitted diseases), if you're at risk.     Ask your doctor if you should have a mammogram.    Have a colonoscopy (test for colon cancer) if someone in your family has had colon cancer or polyps before age 50.     Have a cholesterol test every 5 years.     Have a diabetes test (fasting glucose) after age 45. If you are at risk for diabetes, you should have this test every 3 years.    Shots: Get a flu shot each year. Get a tetanus shot every 10 years.     Nutrition:   Eat at least 5 servings of fruits and vegetables each day.  Eat whole-grain bread, whole-wheat pasta and brown rice instead of white grains and rice.  Talk to your provider about Calcium and Vitamin D.     Lifestyle  Exercise at least 150 minutes a week (an average of 30 minutes a day, 5 days a week). This will help you control your weight and prevent disease.  Limit alcohol to one drink per day.  No smoking.   Wear sunscreen to prevent skin cancer.  See your dentist every six months for an exam and cleaning.          Follow-ups after your visit        Your next 10 appointments already scheduled     Nov 15, 2017   "1:15 PM CST   Office Visit with Gianna Dunne MD   [facility] [place]    [address] [place] [id number] [phone]           Bring a current list of meds and any records pertaining to this visit. For Physicals, please bring immunization records and any forms needing to be filled out. Please arrive 10 minutes early to complete paperwork.              Future tests that were ordered for you today     Open Future Orders        Priority Expected Expires Ordered    Lipid panel reflex to direct LDL Fasting Routine  10/24/2018 10/24/2017    Glucose, whole blood Routine  10/24/2018 10/24/2017            Who to contact     If you have questions or need follow up information about today's clinic visit or your schedule please contact [facility] directly at [phone].  Normal or non-critical lab and imaging results will be communicated to you by MyChart, letter or phone within 4 business days after the clinic has received the results. If you do not hear from us within 7 days, please contact the clinic through [employer] or phone. If you have a critical or abnormal lab result, we will notify you by phone as soon as possible.  Submit refill requests through [employer] or call your pharmacy and they will forward the refill request to us. Please allow 3 business days for your refill to be completed.          Additional Information About Your Visit        MyChart Information     [employer] lets you send messages to your doctor, view your test results, renew your prescriptions, schedule appointments and more. To sign up, go to [email or web address] . Click on \"Log in\" on the left side of the screen, which will take you to the Welcome page. Then click on \"Sign up Now\" on the right side of the page.     You will be asked to enter the access code listed below, as well as some personal information. Please follow the directions to create " your username and password.     Your access code is: [email or web address]  Expires: 2017  1:49 AM     Your access code will  in 90 days. If you need help or a new code, please call your [facility] or [phone].        Care EveryWhere ID     This is your Care EveryWhere ID. This could be used by other organizations to access your [place] medical records  [id number]        Your Vitals Were     Pulse Temperature Last Period Pulse Oximetry BMI (Body Mass Index)       65 98  F (36.7  C) (Oral) 10/16/2017 100% 35.19 kg/m2        Blood Pressure from Last 3 Encounters:   10/24/17 90/60   17 93/59   17 120/90    Weight from Last 3 Encounters:   10/24/17 218 lb (98.9 kg)   17 215 lb 8 oz (97.8 kg)   17 223 lb (101.2 kg)              We Performed the Following     Beta HCG qual IFA urine     CBC with platelets and differential     ESR: Erythrocyte sedimentation rate     TSH with free T4 reflex     UA with Microscopic reflex to Culture     Vitamin D Deficiency          Today's Medication Changes          These changes are accurate as of: 10/24/17 10:56 AM.  If you have any questions, ask your nurse or doctor.               Start taking these medicines.        Dose/Directions    cyclobenzaprine 5 MG tablet   Commonly known as:  FLEXERIL   Used for:  Myalgia   Started by:  Engelmann, Lauren Anneliese, MD        Dose:  5 mg   Take 1 tablet (5 mg) by mouth 3 times daily as needed for muscle spasms   Quantity:  30 tablet   Refills:  1            Where to get your medicines      These medications were sent to [facility] [address] [place], [place] - [address] AT [facility]  [address]  [address] [facility] [address]     Phone:  [phone]     cyclobenzaprine 5 MG tablet                Primary Care Provider Office Phone # Fax #    Emmie King [phone] [address]  [facility] [address]        Equal Access to  Services     [facility]: Hadii deven torre hadsatnamroni Sokellieali, waaxda luqadaha, qaybta kaalmada raymondkendrickfabiana, jesus thompsonjonathanrubin hale . So [facility] [phone].    ATENCIÓN: Si habla español, tiene a rangel disposición servicios gratuitos de asistencia lingüística. Llame al [phone].    We comply with applicable federal civil rights laws and [place] laws. We do not discriminate on the basis of race, color, national origin, age, disability, sex, sexual orientation, or gender identity.            Thank you!     Thank you for choosing [facility]  for your care. Our goal is always to provide you with excellent care. Hearing back from our patients is one way we can continue to improve our services. Please take a few minutes to complete the written survey that you may receive in the mail after your visit with us. Thank you!             Your Updated Medication List - Protect others around you: Learn how to safely use, store and throw away your medicines at www.disposemymeds.org.          This list is accurate as of: 10/24/17 10:56 AM.  Always use your most recent med list.                   Brand Name Dispense Instructions for use Diagnosis    cyclobenzaprine 5 MG tablet    FLEXERIL    30 tablet    Take 1 tablet (5 mg) by mouth 3 times daily as needed for muscle spasms    Myalgia       meclizine 25 MG tablet    ANTIVERT    40 tablet    Take 1 tablet (25 mg) by mouth 3 times daily as needed for dizziness        * order for DME     2 Device    Equipment being ordered: TEDS stockings, thigh length.    Varicose veins       * order for DME     1 Device    Equipment being ordered: TEDs stockings thigh high length.    Varicose veins of leg with swelling, bilateral       * order for DME     1 Device    Equipment being ordered: EMIR stockings thigh high.    Varicose veins of leg with swelling, bilateral       * order for DME     2 Device    Equipment being ordered: EMIR stockings. Thigh high.     Varicose veins of leg with swelling, bilateral       * order for DME     1 Package    Equipment being ordered: compression stockings, bilateral    Varicose veins of both lower extremities with pain       * order for DME     1 Units    Thigh high graduated compression stockings with hip attachment. Class 2.    Varicose veins of both lower extremities       prenatal multivitamin plus iron 27-0.8 MG Tabs per tablet     100 tablet    Take 1 tablet by mouth daily    Secondary female infertility, Irregular menstrual cycle       TYLENOL PO           vitamin D 87538 UNIT capsule    ERGOCALCIFEROL     Take 50,000 Units by mouth daily        * Notice:  This list has 6 medication(s) that are the same as other medications prescribed for you. Read the directions carefully, and ask your doctor or other care provider to review them with you.

## 2017-10-24 NOTE — Clinical Note
Please abstract the following data from this visit with this patient into the appropriate field in Epic:  Please link HPV collected (Ellis, same day as Pap) on 6/11/14 to health maintenance.

## 2017-10-24 NOTE — PROGRESS NOTES
"   SUBJECTIVE:   CC: Sadi Bedoya is an 42 year old woman who presents for preventive health visit.     Healthy Habits:    Do you get at least three servings of calcium containing foods daily (dairy, green leafy vegetables, etc.)? {YES/NO, DAIRY INTAKE:374988::\"yes\"}    Amount of exercise or daily activities, outside of work: {AMOUNT EXERCISE:904883}    Problems taking medications regularly {Yes /No default:759128::\"No\"}    Medication side effects: {Yes /No default.:622798::\"No\"}    Have you had an eye exam in the past two years? {YESNOBLANK:844449}    Do you see a dentist twice per year? {YESNOBLANK:616045}    Do you have sleep apnea, excessive snoring or daytime drowsiness?{YESNOBLANK:506430}    {Outside tests to abstract? :747105}    {additional problems to add (Optional):397190}    Today's PHQ-2 Score:   PHQ-2 ( 1999 Pfizer) 12/1/2015 11/19/2014   Q1: Little interest or pleasure in doing things 0 0   Q2: Feeling down, depressed or hopeless 0 0   PHQ-2 Score 0 0     {PHQ-2 LOOK IN ASSESSMENTS (Optional) :154975}  Abuse: Current or Past(Physical, Sexual or Emotional)- {YES/NO/NA:861530}  Do you feel safe in your environment - {YES/NO/NA:772352}    Social History   Substance Use Topics     Smoking status: Never Smoker     Smokeless tobacco: Never Used     Alcohol use No     {ETOH AUDIT:549461}    Reviewed orders with patient.  Reviewed health maintenance and updated orders accordingly - {Yes/No:438449::\"Yes\"}  {Chronicprobdata (Optional):418118}    {Mammo Decision Support (Optional):153511}    Pertinent mammograms are reviewed under the imaging tab.  History of abnormal Pap smear: {PAP HX:808235}    Reviewed and updated as needed this visit by clinical staff         Reviewed and updated as needed this visit by Provider        {HISTORY OPTIONS (Optional):993869}    ROS:  {FEMALE PREVENTATIVE ROS:277823}    OBJECTIVE:   There were no vitals taken for this visit.  EXAM:  {Exam Choices:354821}    ASSESSMENT/PLAN: " "  {Dia Picklist:561626}    COUNSELING:   {FEMALE COUNSELING MESSAGES:561574::\"Reviewed preventive health counseling, as reflected in patient instructions\"}    {BP Counseling- Complete if BP >= 120/80  (Optional):533573}     reports that she has never smoked. She has never used smokeless tobacco.  {Tobacco Cessation -- Complete if patient is a smoker (Optional):909633}  Estimated body mass index is 34.78 kg/(m^2) as calculated from the following:    Height as of 9/29/17: 5' 6\" (1.676 m).    Weight as of 9/29/17: 215 lb 8 oz (97.8 kg).   {Weight Management Plan (ACO) Complete if BMI is abnormal-  Ages 18-64  BMI >24.9.  Age 65+ with BMI <23 or >30 (Optional):284716}    Counseling Resources:  ATP IV Guidelines  Pooled Cohorts Equation Calculator  Breast Cancer Risk Calculator  FRAX Risk Assessment  ICSI Preventive Guidelines  Dietary Guidelines for Americans, 2010  USDA's MyPlate  ASA Prophylaxis  Lung CA Screening    Lauren A. Engelmann, MD  [facility]  "

## 2017-10-24 NOTE — PROGRESS NOTES
SUBJECTIVE:   CC: Sadi Bedoya is an 42 year old woman who presents for preventive health visit.     Healthy Habits:    Do you get at least three servings of calcium containing foods daily (dairy, green leafy vegetables, etc.)? Sometimes    Amount of exercise or daily activities, outside of work: Sometimes    Problems taking medications regularly No    Medication side effects: No    Have you had an eye exam in the past two years? yes    Do you see a dentist twice per year? no    Do you have sleep apnea, excessive snoring or daytime drowsiness?no      Stated that she has been feeling nauseated and has been having a headache.  Also stated she is allergic to medications with alcohol in them- added this to allergy list.  Also complains of ongoing leg pain, not using her compression stockings.   Wants to get pregnant again but hasn't followed up with OB.    Today's PHQ-2 Score:   PHQ-2 ( 1999 Pfizer) 10/24/2017 12/1/2015   Q1: Little interest or pleasure in doing things 0 0   Q2: Feeling down, depressed or hopeless 0 0   PHQ-2 Score 0 0     Abuse: Current or Past(Physical, Sexual or Emotional)- No  Do you feel safe in your environment - Yes    Social History   Substance Use Topics     Smoking status: Never Smoker     Smokeless tobacco: Never Used     Alcohol use No     The patient does not drink >3 drinks per day nor >7 drinks per week.    Meg Estrada MA    Reviewed orders with patient.  Reviewed health maintenance and updated orders accordingly - Yes  BP Readings from Last 3 Encounters:   10/26/17 110/67   10/24/17 90/60   09/30/17 93/59    Wt Readings from Last 3 Encounters:   10/26/17 217 lb (98.4 kg)   10/24/17 218 lb (98.9 kg)   09/29/17 215 lb 8 oz (97.8 kg)                  Current Outpatient Prescriptions   Medication Sig Dispense Refill     Acetaminophen (TYLENOL PO)        vitamin D (ERGOCALCIFEROL) 99492 UNIT capsule Take 50,000 Units by mouth daily       naproxen (NAPROSYN) 500 MG tablet Take 1  tablet (500 mg) by mouth 2 times daily as needed for moderate pain 60 tablet 1     order for DME Knee high moderate compression stockings 1 each 0         Patient under age 50, mutual decision reflected in health maintenance.        Pertinent mammograms are reviewed under the imaging tab.  History of abnormal Pap smear: NO - age 30-65 PAP every 5 years with negative HPV co-testing recommended    Reviewed and updated as needed this visit by clinical staffTobacco  Allergies  Meds  Med Hx  Surg Hx  Fam Hx  Soc Hx        Reviewed and updated as needed this visit by Provider        Obstetric History       T3      L3     SAB0   TAB0   Ectopic0   Multiple0   Live Births3       # Outcome Date GA Lbr Brett/2nd Weight Sex Delivery Anes PTL Lv   4 Term  40w0d  8 lb (3.629 kg) F    ALEA   3 SAB            2 Term  40w0d   M    ALEA   1 Term  40w0d   M    ALEA          ROS:  C: NEGATIVE for fever, chills, change in weight  I: NEGATIVE for worrisome rashes, moles or lesions  E: NEGATIVE for vision changes or irritation  ENT: NEGATIVE for ear, mouth and throat problems  R: NEGATIVE for significant cough or SOB  B: NEGATIVE for masses, tenderness or discharge  CV: NEGATIVE for chest pain, palpitations or peripheral edema  GI: NEGATIVE for nausea, abdominal pain, heartburn, or change in bowel habits  : NEGATIVE for unusual urinary or vaginal symptoms. Periods are regular.  N: NEGATIVE for weakness, dizziness or paresthesias  P: NEGATIVE for changes in mood or affect    OBJECTIVE:   BP 90/60 (BP Location: Left arm, Patient Position: Chair, Cuff Size: Adult Large)  Pulse 65  Temp 98  F (36.7  C) (Oral)  Wt 218 lb (98.9 kg)  LMP 10/16/2017  SpO2 100%  BMI 35.19 kg/m2  EXAM:  GENERAL: alert, no distress and obese  NECK: no adenopathy, no asymmetry, masses, or scars and thyroid normal to palpation  RESP: lungs clear to auscultation - no rales, rhonchi or wheezes  CV: regular rate and  "rhythm, normal S1 S2, no S3 or S4, no murmur, click or rub, no peripheral edema and peripheral pulses strong  ABDOMEN: soft, nontender, no hepatosplenomegaly, no masses and bowel sounds normal  MS: varicose veins in bilateral lower extremities, diffuse tenderness to palpation of soft tissue of lower legs, neg homans sign, no calf size discrepancy   NEURO: Normal strength and tone, mentation intact and speech normal  BACK: no CVA tenderness, no paralumbar tenderness  PSYCH: mentation appears normal, affect normal/bright    ASSESSMENT/PLAN:       ICD-10-CM    1. Routine general medical examination at a health care facility Z00.00    2. Nausea R11.0 Beta HCG qual IFA urine   3. Dysuria R30.0 UA with Microscopic reflex to Culture   4. Myalgia M79.1 ESR: Erythrocyte sedimentation rate     CBC with platelets and differential     DISCONTINUED: cyclobenzaprine (FLEXERIL) 5 MG tablet   5. Secondary female infertility N97.9 Beta HCG qual IFA urine     TSH with free T4 reflex   6. Vitamin D deficiency E55.9 Vitamin D Deficiency   7. Screening for diabetes mellitus Z13.1 Glucose, whole blood   8. Screening for lipid disorders Z13.220 Lipid panel reflex to direct LDL Fasting     Patient refused  today and I have asked her to come back to discuss results with professional .   Will screen for rheumatologic disorders given long history of myalgias, and advised her to wear compression stockings for varicose veins.   Advised follow up with her OB if she wants to discuss more children, UPT neg today.     COUNSELING:   Reviewed preventive health counseling, as reflected in patient instructions       Regular exercise       Healthy diet/nutrition       Contraception         reports that she has never smoked. She has never used smokeless tobacco.    Estimated body mass index is 35.19 kg/(m^2) as calculated from the following:    Height as of 9/29/17: 5' 6\" (1.676 m).    Weight as of this encounter: 218 lb (98.9 kg). "   Weight management plan: Discussed healthy diet and exercise guidelines and patient will follow up in 6 months in clinic to re-evaluate.    Counseling Resources:  ATP IV Guidelines  Pooled Cohorts Equation Calculator  Breast Cancer Risk Calculator  FRAX Risk Assessment  ICSI Preventive Guidelines  Dietary Guidelines for Americans, 2010  USDA's MyPlate  ASA Prophylaxis  Lung CA Screening    Lauren A. Engelmann, MD  [facility]

## 2017-10-25 LAB — DEPRECATED CALCIDIOL+CALCIFEROL SERPL-MC: 50 UG/L (ref 20–75)

## 2017-10-26 ENCOUNTER — OFFICE VISIT (OUTPATIENT)
Dept: FAMILY MEDICINE | Facility: CLINIC | Age: 42
End: 2017-10-26
Payer: MEDICAID

## 2017-10-26 VITALS
TEMPERATURE: 96.9 F | HEART RATE: 68 BPM | OXYGEN SATURATION: 100 % | WEIGHT: 217 LBS | SYSTOLIC BLOOD PRESSURE: 110 MMHG | DIASTOLIC BLOOD PRESSURE: 67 MMHG | BODY MASS INDEX: 35.02 KG/M2

## 2017-10-26 DIAGNOSIS — M15.0 PRIMARY OSTEOARTHRITIS INVOLVING MULTIPLE JOINTS: ICD-10-CM

## 2017-10-26 DIAGNOSIS — M19.90 INFLAMMATORY ARTHRITIS: Primary | ICD-10-CM

## 2017-10-26 DIAGNOSIS — I83.893 SYMPTOMATIC VARICOSE VEINS OF BOTH LOWER EXTREMITIES: ICD-10-CM

## 2017-10-26 LAB
ALBUMIN SERPL-MCNC: 3.5 G/DL (ref 3.4–5)
ALP SERPL-CCNC: 75 U/L (ref 40–150)
ALT SERPL W P-5'-P-CCNC: 18 U/L (ref 0–50)
AST SERPL W P-5'-P-CCNC: 18 U/L (ref 0–45)
BILIRUB DIRECT SERPL-MCNC: 0.2 MG/DL (ref 0–0.2)
BILIRUB SERPL-MCNC: 0.5 MG/DL (ref 0.2–1.3)
CRP SERPL-MCNC: 11.8 MG/L (ref 0–8)
PROT SERPL-MCNC: 7.8 G/DL (ref 6.8–8.8)
URATE SERPL-MCNC: 3.9 MG/DL (ref 2.6–6)

## 2017-10-26 PROCEDURE — 84550 ASSAY OF BLOOD/URIC ACID: CPT | Performed by: INTERNAL MEDICINE

## 2017-10-26 PROCEDURE — 86038 ANTINUCLEAR ANTIBODIES: CPT | Performed by: INTERNAL MEDICINE

## 2017-10-26 PROCEDURE — T1013 SIGN LANG/ORAL INTERPRETER: HCPCS | Mod: U3 | Performed by: INTERNAL MEDICINE

## 2017-10-26 PROCEDURE — 86140 C-REACTIVE PROTEIN: CPT | Performed by: INTERNAL MEDICINE

## 2017-10-26 PROCEDURE — 80076 HEPATIC FUNCTION PANEL: CPT | Performed by: INTERNAL MEDICINE

## 2017-10-26 PROCEDURE — 36415 COLL VENOUS BLD VENIPUNCTURE: CPT | Performed by: INTERNAL MEDICINE

## 2017-10-26 PROCEDURE — 99214 OFFICE O/P EST MOD 30 MIN: CPT | Performed by: INTERNAL MEDICINE

## 2017-10-26 PROCEDURE — 86431 RHEUMATOID FACTOR QUANT: CPT | Performed by: INTERNAL MEDICINE

## 2017-10-26 RX ORDER — NAPROXEN 500 MG/1
500 TABLET ORAL 2 TIMES DAILY PRN
Qty: 60 TABLET | Refills: 1 | Status: SHIPPED | OUTPATIENT
Start: 2017-10-26 | End: 2020-09-14

## 2017-10-26 ASSESSMENT — PAIN SCALES - GENERAL: PAINLEVEL: NO PAIN (0)

## 2017-10-26 NOTE — LETTER
[facility]  [address] [place]  [address]  [phone]        November 1, 2017    Sadi Bedoya  [address]   [facility] [address]        Dear Sadi,    Labs are stable.    Results for orders placed or performed in visit on 10/26/17   Uric acid   Result Value Ref Range    Uric Acid 3.9 2.6 - 6.0 mg/dL   CRP, inflammation   Result Value Ref Range    CRP Inflammation 11.8 (H) 0.0 - 8.0 mg/L   Rheumatoid factor   Result Value Ref Range    Rheumatoid Factor <20 <20 IU/mL   Anti Nuclear [place] IgG by IFA with Reflex   Result Value Ref Range    ALETHA interpretation Negative NEG^Negative   Hepatic panel (Albumin, ALT, AST, Bili, Alk Phos, TP)   Result Value Ref Range    Bilirubin Direct 0.2 0.0 - 0.2 mg/dL    Bilirubin Total 0.5 0.2 - 1.3 mg/dL    Albumin 3.5 3.4 - 5.0 g/dL    Protein Total 7.8 6.8 - 8.8 g/dL    Alkaline Phosphatase 75 40 - 150 U/L    ALT 18 0 - 50 U/L    AST 18 0 - 45 U/L       If you have any questions please call the clinic at [phone].    Sincerely,    Channing MONTAÑO

## 2017-10-26 NOTE — PROGRESS NOTES
SUBJECTIVE:   Sadi Bedoya is a 42 year old female who presents to clinic today for the following health issues:    Discuss Arthritis in leg; Early RA.     Early RA  [facility]  Knee and foot  Hurt that day  Hard to place feet on the   Bottom and the heal   Sensation of pain  Hands are good   No rash          Problem list and histories reviewed & adjusted, as indicated.  Additional history: as documented    Patient Active Problem List   Diagnosis     CARDIOVASCULAR SCREENING; LDL GOAL LESS THAN 160     Female infertility of other specified origin     Past Surgical History:   Procedure Laterality Date     CHOLECYSTECTOMY         Social History   Substance Use Topics     Smoking status: Never Smoker     Smokeless tobacco: Never Used     Alcohol use No     Family History   Problem Relation Age of Onset     Liver Disease Mother          Current Outpatient Prescriptions   Medication Sig Dispense Refill     naproxen (NAPROSYN) 500 MG tablet Take 1 tablet (500 mg) by mouth 2 times daily as needed for moderate pain 60 tablet 1     order for DME Knee high moderate compression stockings 1 each 0     vitamin D (ERGOCALCIFEROL) 28607 UNIT capsule Take 50,000 Units by mouth daily       Acetaminophen (TYLENOL PO)        Allergies   Allergen Reactions     Alcohol [Ethanol]      Gelatin      No Known Drug Allergy      Recent Labs   Lab Test  10/26/17   1238  10/24/17   1102  11/11/16   1140  12/01/15   1043   09/10/14   1312  04/19/14   2043   ALT  18   --    --   19   --    --   17   CR   --    --    --   0.43*   --   0.41*  0.40*   GFRESTIMATED   --    --    --   >90  Non  GFR Calc     --   >90  Non  GFR Calc    >90   GFRESTBLACK   --    --    --   >90   GFR Calc     --   >90   GFR Calc    >90   POTASSIUM   --    --    --   3.8   --   3.5  4.1   TSH   --   3.24  4.21*  3.12   < >   --    --     < > = values in this interval not displayed.             Reviewed and updated as needed this visit by clinical staffAllergies       Reviewed and updated as needed this visit by Provider         ROS:  Constitutional, HEENT, cardiovascular, pulmonary, gi and gu systems are negative, except as otherwise noted.      OBJECTIVE:   /67 (BP Location: Right arm, Patient Position: Chair, Cuff Size: Adult Large)  Pulse 68  Temp 96.9  F (36.1  C) (Oral)  Wt 217 lb (98.4 kg)  LMP 10/16/2017  SpO2 100%  Breastfeeding? No  BMI 35.02 kg/m2  Body mass index is 35.02 kg/(m^2).  GENERAL: healthy, alert and no distress  NECK: no adenopathy, no asymmetry, masses, or scars and thyroid normal to palpation  RESP: lungs clear to auscultation - no rales, rhonchi or wheezes  CV: regular rate and rhythm, normal S1 S2, no S3 or S4, no murmur, click or rub, no peripheral edema and peripheral pulses strong  ABDOMEN: soft, nontender, no hepatosplenomegaly, no masses and bowel sounds normal  MS: no gross musculoskeletal defects noted, no edema    Diagnostic Test Results:  Results for orders placed or performed in visit on 10/26/17   Uric acid   Result Value Ref Range    Uric Acid 3.9 2.6 - 6.0 mg/dL   CRP, inflammation   Result Value Ref Range    CRP Inflammation 11.8 (H) 0.0 - 8.0 mg/L   Rheumatoid factor   Result Value Ref Range    Rheumatoid Factor <20 <20 IU/mL   Anti Nuclear Laurel IgG by IFA with Reflex   Result Value Ref Range    ALETHA interpretation Negative NEG^Negative   Hepatic panel (Albumin, ALT, AST, Bili, Alk Phos, TP)   Result Value Ref Range    Bilirubin Direct 0.2 0.0 - 0.2 mg/dL    Bilirubin Total 0.5 0.2 - 1.3 mg/dL    Albumin 3.5 3.4 - 5.0 g/dL    Protein Total 7.8 6.8 - 8.8 g/dL    Alkaline Phosphatase 75 40 - 150 U/L    ALT 18 0 - 50 U/L    AST 18 0 - 45 U/L       ASSESSMENT/PLAN:       ICD-10-CM    1. Inflammatory arthritis M19.90 Uric acid     CRP, inflammation     Rheumatoid factor     Anti Nuclear Laurel IgG by IFA with Reflex     Hepatic panel (Albumin, ALT, AST,  Sukhwinder Ward Phos, TP)     naproxen (NAPROSYN) 500 MG tablet   2. Primary osteoarthritis involving multiple joints M15.0 naproxen (NAPROSYN) 500 MG tablet   3. Symptomatic varicose veins of both lower extremities I83.893 order for ANISA Peacock MD  [facility]

## 2017-10-26 NOTE — MR AVS SNAPSHOT
After Visit Summary   10/26/2017    Sadi Bedoya    MRN: [id number]           Patient Information     Date Of Birth          1975        Visit Information        Provider Department      10/26/2017 11:40 AM Channing Peacock MD; [employer] [facility]        Today's Diagnoses     Inflammatory arthritis    -  1    Primary osteoarthritis involving multiple joints        Symptomatic varicose veins of both lower extremities          Care Instructions    Osteoarthritis  Plantar fascitis  Heal insert            Follow-ups after your visit        Your next 10 appointments already scheduled     Nov 15, 2017  1:15 PM CST   Office Visit with Gianna Dunne MD   [facility] ([facility])    [address] [place] [id number] [phone]           Bring a current list of meds and any records pertaining to this visit. For Physicals, please bring immunization records and any forms needing to be filled out. Please arrive 10 minutes early to complete paperwork.              Who to contact     If you have questions or need follow up information about today's clinic visit or your schedule please contact [facility] directly at [phone].  Normal or non-critical lab and imaging results will be communicated to you by [employer], letter or phone within 4 business days after the clinic has received the results. If you do not hear from us within 7 days, please contact the clinic through [employer] or phone. If you have a critical or abnormal lab result, we will notify you by phone as soon as possible.  Submit refill requests through [employer] or call your pharmacy and they will forward the refill request to us. Please allow 3 business days for your refill to be completed.          Additional Information About Your Visit        [employer] Information     [employer] lets you send messages to your  "doctor, view your test results, renew your prescriptions, schedule appointments and more. To sign up, go to [email or web address] . Click on \"Log in\" on the left side of the screen, which will take you to the Welcome page. Then click on \"Sign up Now\" on the right side of the page.     You will be asked to enter the access code listed below, as well as some personal information. Please follow the directions to create your username and password.     Your access code is: [email or web address]  Expires: 2017  1:49 AM     Your access code will  in 90 days. If you need help or a new code, please call your [place] clinic or [phone].        Care EveryWhere ID     This is your Care EveryWhere ID. This could be used by other organizations to access your [place] medical records  [id number]        Your Vitals Were     Pulse Temperature Last Period Pulse Oximetry Breastfeeding? BMI (Body Mass Index)    68 96.9  F (36.1  C) (Oral) 10/16/2017 100% No 35.02 kg/m2       Blood Pressure from Last 3 Encounters:   10/26/17 110/67   10/24/17 90/60   17 93/59    Weight from Last 3 Encounters:   10/26/17 217 lb (98.4 kg)   10/24/17 218 lb (98.9 kg)   17 215 lb 8 oz (97.8 kg)              We Performed the Following     Anti Nuclear Laurel IgG by IFA with Reflex     CRP, inflammation     Hepatic panel (Albumin, ALT, AST, Bili, Alk Phos, TP)     Rheumatoid factor     Uric acid          Today's Medication Changes          These changes are accurate as of: 10/26/17 12:31 PM.  If you have any questions, ask your nurse or doctor.               Start taking these medicines.        Dose/Directions    naproxen 500 MG tablet   Commonly known as:  NAPROSYN   Used for:  Inflammatory arthritis, Primary osteoarthritis involving multiple joints   Started by:  Channing Peacock MD        Dose:  500 mg   Take 1 tablet (500 mg) by mouth 2 times daily as needed for moderate pain   Quantity:  60 tablet   Refills:  1         These " medicines have changed or have updated prescriptions.        Dose/Directions    order for DME   This may have changed:    - additional instructions  - Another medication with the same name was removed. Continue taking this medication, and follow the directions you see here.   Used for:  Symptomatic varicose veins of both lower extremities   Changed by:  Channing Peacock MD        Knee high moderate compression stockings   Quantity:  1 each   Refills:  0         Stop taking these medicines if you haven't already. Please contact your care team if you have questions.     cyclobenzaprine 5 MG tablet   Commonly known as:  FLEXERIL   Stopped by:  Channing Peacock MD           meclizine 25 MG tablet   Commonly known as:  ANTIVERT   Stopped by:  Channing Peacock MD           prenatal multivitamin plus iron 27-0.8 MG Tabs per tablet   Stopped by:  Channing Peacock MD                Where to get your medicines      These medications were sent to [facility] [address] [place], [place] - [address] AT [facility] [address]  [address] [facility] [address]     Phone:  [phone]     naproxen 500 MG tablet         Some of these will need a paper prescription and others can be bought over the counter.  Ask your nurse if you have questions.     Bring a paper prescription for each of these medications     order for DME                Primary Care Provider Office Phone # Fax #    Emmie Kal King [phone]       [address] [facility] [address]        Equal Access to Services     ANABELLE COBOS AH: Hadii aad ku hadasho Soteddy, waaxda luqadaha, qaybta kaalmada adeegyada, jesus hale . So [facility] [phone].    ATENCIÓN: Si habla español, tiene a rangel disposición servicios gratuitos de asistencia lingüística. Llame al [phone].    We comply with applicable federal civil rights laws and [place] laws. We do not discriminate on the  basis of race, color, national origin, age, disability, sex, sexual orientation, or gender identity.            Thank you!     Thank you for choosing [facility]  for your care. Our goal is always to provide you with excellent care. Hearing back from our patients is one way we can continue to improve our services. Please take a few minutes to complete the written survey that you may receive in the mail after your visit with us. Thank you!             Your Updated Medication List - Protect others around you: Learn how to safely use, store and throw away your medicines at www.disposemymeds.org.          This list is accurate as of: 10/26/17 12:31 PM.  Always use your most recent med list.                   Brand Name Dispense Instructions for use Diagnosis    naproxen 500 MG tablet    NAPROSYN    60 tablet    Take 1 tablet (500 mg) by mouth 2 times daily as needed for moderate pain    Inflammatory arthritis, Primary osteoarthritis involving multiple joints       order for DME     1 each    Knee high moderate compression stockings    Symptomatic varicose veins of both lower extremities       TYLENOL PO           vitamin D 54439 UNIT capsule    ERGOCALCIFEROL     Take 50,000 Units by mouth daily

## 2017-10-26 NOTE — NURSING NOTE
"Chief Complaint   Patient presents with     Musculoskeletal Problem       Initial /67 (BP Location: Right arm, Patient Position: Chair, Cuff Size: Adult Large)  Pulse 68  Temp 96.9  F (36.1  C) (Oral)  Wt 217 lb (98.4 kg)  LMP 10/16/2017  SpO2 100%  Breastfeeding? No  BMI 35.02 kg/m2 Estimated body mass index is 35.02 kg/(m^2) as calculated from the following:    Height as of 9/29/17: 5' 6\" (1.676 m).    Weight as of this encounter: 217 lb (98.4 kg).  Medication Reconciliation: complete   Nelsy Dove MA      "

## 2017-10-27 LAB
ANA SER QL IF: NEGATIVE
RHEUMATOID FACT SER NEPH-ACNC: <20 IU/ML (ref 0–20)

## 2017-12-14 ENCOUNTER — TELEPHONE (OUTPATIENT)
Dept: FAMILY MEDICINE | Facility: CLINIC | Age: 42
End: 2017-12-14

## 2017-12-14 DIAGNOSIS — R60.0 PERIPHERAL EDEMA: Primary | ICD-10-CM

## 2017-12-14 DIAGNOSIS — M19.90 INFLAMMATORY ARTHRITIS: ICD-10-CM

## 2017-12-14 NOTE — TELEPHONE ENCOUNTER
I am listed as PCP however not seen this pt recently.     Emmie King MD.   Family Physician.  [facility].

## 2017-12-14 NOTE — TELEPHONE ENCOUNTER
Reason for Call:  Other prescription    Detailed comments:  Patient lost script for some sock needs another one    Phone Number Patient can be reached at: Home number on file [phone] (home)    Best Time:  Anytime     Can we leave a detailed message on this number? YES    Call taken on 12/14/2017 at 10:06 AM by Megan Mcadams

## 2017-12-14 NOTE — TELEPHONE ENCOUNTER
Spoke with patient.  She is also looking for a DME for shoes as well.  Painful when she walks.  Routing message to provider to advise/write order.    Patient would like orders mailed to her.  Please call when done.

## 2017-12-20 NOTE — TELEPHONE ENCOUNTER
stefan rinted the script, although shoes usually require a diagnosis of diabetes with neuropathy or other specific diagnosis

## 2018-02-13 ENCOUNTER — OFFICE VISIT (OUTPATIENT)
Dept: FAMILY MEDICINE | Facility: CLINIC | Age: 43
End: 2018-02-13
Payer: COMMERCIAL

## 2018-02-13 VITALS
HEIGHT: 64 IN | SYSTOLIC BLOOD PRESSURE: 112 MMHG | TEMPERATURE: 97.1 F | BODY MASS INDEX: 38.28 KG/M2 | DIASTOLIC BLOOD PRESSURE: 75 MMHG | WEIGHT: 224.2 LBS | OXYGEN SATURATION: 98 % | HEART RATE: 77 BPM

## 2018-02-13 DIAGNOSIS — M79.604 BILATERAL LEG PAIN: ICD-10-CM

## 2018-02-13 DIAGNOSIS — M79.605 BILATERAL LEG PAIN: ICD-10-CM

## 2018-02-13 DIAGNOSIS — I83.93 VARICOSE VEINS OF BOTH LOWER EXTREMITIES: ICD-10-CM

## 2018-02-13 DIAGNOSIS — R60.0 PERIPHERAL EDEMA: Primary | ICD-10-CM

## 2018-02-13 DIAGNOSIS — M72.2 PLANTAR FASCIITIS: ICD-10-CM

## 2018-02-13 PROCEDURE — 99214 OFFICE O/P EST MOD 30 MIN: CPT | Performed by: PHYSICIAN ASSISTANT

## 2018-02-13 RX ORDER — PRENATAL VIT/IRON FUM/FOLIC AC 27MG-0.8MG
1 TABLET ORAL EVERY MORNING
COMMUNITY

## 2018-02-13 ASSESSMENT — PAIN SCALES - GENERAL: PAINLEVEL: MODERATE PAIN (5)

## 2018-02-13 NOTE — NURSING NOTE
"Chief Complaint   Patient presents with     Musculoskeletal Problem     leg pain       Initial /75 (BP Location: Right arm, Patient Position: Chair, Cuff Size: Adult Large)  Pulse 77  Temp 97.1  F (36.2  C) (Oral)  Ht 5' 3.98\" (1.625 m)  Wt 224 lb 3.2 oz (101.7 kg)  SpO2 98%  BMI 38.51 kg/m2 Estimated body mass index is 38.51 kg/(m^2) as calculated from the following:    Height as of this encounter: 5' 3.98\" (1.625 m).    Weight as of this encounter: 224 lb 3.2 oz (101.7 kg).  Medication Reconciliation: complete   Simona See ALMAZ Espinoza      "

## 2018-02-13 NOTE — PROGRESS NOTES
SUBJECTIVE:   Sadi Bedoya is a 43 year old female who presents to clinic today for the following health issues:      Patient is here to follow up on leg pain. She states she is having more leg pain than before.    Patient is here to follow up on her leg pain. She has had more fatigue and legs are tired. The pain feels like a deep muscle pain. Pain is worse when she is up and moving. Laying flat is helpful. Never uses heat or ice.   Naproxen was helpful, only taking it when it was severe.   Pain starts at the upper thighs and goes all the way to the feet.   Has some numbness and tingling at times.   Only completed 1 session of physical therapy.   Lost her prescription for the compression socks.   Also burning in her heels- worse when she is trying to stand up from the bed.     Has had a rheum work up. Negative RA and ALETHA, slightly elevated CRP.       Problem list and histories reviewed & adjusted, as indicated.  Additional history: as documented    Patient Active Problem List   Diagnosis     CARDIOVASCULAR SCREENING; LDL GOAL LESS THAN 160     Female infertility of other specified origin     Varicose veins of both lower extremities     Past Surgical History:   Procedure Laterality Date     CHOLECYSTECTOMY         Social History   Substance Use Topics     Smoking status: Never Smoker     Smokeless tobacco: Never Used     Alcohol use No     Family History   Problem Relation Age of Onset     Liver Disease Mother            Reviewed and updated as needed this visit by clinical staff  Tobacco  Allergies  Meds  Problems  Med Hx  Surg Hx  Fam Hx  Soc Hx        Reviewed and updated as needed this visit by Provider  Allergies  Meds  Problems         ROS:  Constitutional, HEENT, cardiovascular, pulmonary, gi and gu systems are negative, except as otherwise noted.    OBJECTIVE:     /75 (BP Location: Right arm, Patient Position: Chair, Cuff Size: Adult Large)  Pulse 77  Temp 97.1  F (36.2  C) (Oral)   "Ht 5' 3.98\" (1.625 m)  Wt 224 lb 3.2 oz (101.7 kg)  SpO2 98%  BMI 38.51 kg/m2  Body mass index is 38.51 kg/(m^2).  GENERAL: healthy, alert and no distress  MS: no gross musculoskeletal defects noted, no edema- Pain with palpation of the plantar surfaces of both feet. Bilateral legs with 1+ generalized edema with moderate varicose veins. Tender to palpation to the bilateral legs, no specific joint pain with palpation.   SKIN: no suspicious lesions or rashes  NEURO: Normal strength and tone, mentation intact and speech normal    Diagnostic Test Results:  none     ASSESSMENT/PLAN:       ICD-10-CM    1. Peripheral edema R60.9 order for DME   2. Bilateral leg pain M79.604 [facility] PT, HAND, AND CHIROPRACTIC REFERRAL    M79.605 order for DME   3. Plantar fasciitis M72.2 [facility] PT, HAND, AND CHIROPRACTIC REFERRAL     order for DME   4. Varicose veins of both lower extremities I83.93    Patient never picked up the compression stockings and arch supports that were previously prescribed. Given new prescription's. Patient will try physical therapy as well.   Patient encouraged to pick PCP for continuity of care.     FUTURE APPOINTMENTS:       - Follow-up for annual visit or as needed    Theresa Vanegas PA-C  [facility]  "

## 2018-02-13 NOTE — MR AVS SNAPSHOT
After Visit Summary   2/13/2018    Sadi Bedoya    MRN: [id number]           Patient Information     Date Of Birth          1975        Visit Information        Provider Department      2/13/2018 1:40 PM Theresa Vanegas PA-C; [facility]        Today's Diagnoses     Peripheral edema    -  1    Bilateral leg pain        Plantar fasciitis        Varicose veins of both lower extremities          Care Instructions    1. Wear compression stockings every day.   2. Follow up with physical therapy.           Follow-ups after your visit        Additional Services     [facility] PT, HAND, AND CHIROPRACTIC REFERRAL       **This order will print in the [facility] Scheduling Office**    Physical Therapy, Hand Therapy and Chiropractic Care are available through:    *[facility]  *[facility]  *[facility]    Call one number to schedule at any of the above locations: [phone].    Your provider has referred you to: Physical Therapy at [facility] or [facility]    Indication/Reason for Referral: bilateral leg pain. /plantar fascitis.   Onset of Illness:months  Therapy Orders: Evaluate and Treat  Special Programs: None  Special Request: Faustino Vallejo      Additional Comments for the Therapist or Chiropractor:     Please be aware that coverage of these services is subject to the terms and limitations of your health insurance plan.  Call member services at your health plan with any benefit or coverage questions.      Please bring the following to your appointment:    *Your personal calendar for scheduling future appointments  *Comfortable clothing                  Who to contact     If you have questions or need follow up information about today's clinic visit or your schedule please contact [facility] directly at [phone].  Normal or non-critical lab and imaging results will be communicated to you by  "MyChart, letter or phone within 4 business days after the clinic has received the results. If you do not hear from us within 7 days, please contact the clinic through [employer] or phone. If you have a critical or abnormal lab result, we will notify you by phone as soon as possible.  Submit refill requests through [employer] or call your pharmacy and they will forward the refill request to us. Please allow 3 business days for your refill to be completed.          Additional Information About Your Visit        [employer] [facility] Information     [employer] lets you send messages to your doctor, view your test results, renew your prescriptions, schedule appointments and more. To sign up, go to [email or web address] . Click on \"Log in\" on the left side of the screen, which will take you to the Welcome page. Then click on \"Sign up Now\" on the right side of the page.     You will be asked to enter the access code listed below, as well as some personal information. Please follow the directions to create your username and password.     Your access code is: [email or web address]  Expires: 2018  2:30 PM     Your access code will  in 90 days. If you need help or a new code, please call your [place] clinic or [phone].        Care EveryWhere ID     This is your Care EveryWhere ID. This could be used by other organizations to access your [place] medical records  [id number]        Your Vitals Were     Pulse Temperature Height Pulse Oximetry BMI (Body Mass Index)       77 97.1  F (36.2  C) (Oral) 5' 3.98\" (1.625 m) 98% 38.51 kg/m2        Blood Pressure from Last 3 Encounters:   18 112/75   10/26/17 110/67   10/24/17 90/60    Weight from Last 3 Encounters:   18 224 lb 3.2 oz (101.7 kg)   10/26/17 217 lb (98.4 kg)   10/24/17 218 lb (98.9 kg)              We Performed the Following     [facility] PT, HAND, AND CHIROPRACTIC REFERRAL          Where to get your medicines      Some of these will need a paper prescription and others can be " bought over the counter.  Ask your nurse if you have questions.     Bring a paper prescription for each of these medications     order for DME    order for DME          Primary Care Provider Office Phone # Fax #    Emmie Kal King [phone]       [address] [facility] [address]        Equal Access to Services     ANABELLE COBOS : Hadii aad ku hadasho Soomaali, waaxda luqadaha, qaybta kaalmada adeegyada, waxay tinoin hayaan adeeg kharash lamengn . So [facility] [phone].    ATENCIÓN: Si habla español, tiene a rangel disposición servicios gratuitos de asistencia lingüística. Llame al [phone].    We comply with applicable federal civil rights laws and [place] laws. We do not discriminate on the basis of race, color, national origin, age, disability, sex, sexual orientation, or gender identity.            Thank you!     Thank you for choosing [facility]  for your care. Our goal is always to provide you with excellent care. Hearing back from our patients is one way we can continue to improve our services. Please take a few minutes to complete the written survey that you may receive in the mail after your visit with us. Thank you!             Your Updated Medication List - Protect others around you: Learn how to safely use, store and throw away your medicines at www.disposemymeds.org.          This list is accurate as of 2/13/18  2:30 PM.  Always use your most recent med list.                   Brand Name Dispense Instructions for use Diagnosis    naproxen 500 MG tablet    NAPROSYN    60 tablet    Take 1 tablet (500 mg) by mouth 2 times daily as needed for moderate pain    Inflammatory arthritis, Primary osteoarthritis involving multiple joints       * order for DME     2 each    Equipment being ordered: compression stockings knee high moderate compression    Peripheral edema       * order for DME     1 each    Equipment being ordered: shoes with arch support  and heal cushioning    Bilateral leg pain, Plantar fasciitis       prenatal multivitamin plus iron 27-0.8 MG Tabs per tablet      Take 1 tablet by mouth daily        TYLENOL PO           vitamin D 12597 UNIT capsule    ERGOCALCIFEROL     Take 50,000 Units by mouth daily        * Notice:  This list has 2 medication(s) that are the same as other medications prescribed for you. Read the directions carefully, and ask your doctor or other care provider to review them with you.

## 2018-04-05 ENCOUNTER — OFFICE VISIT (OUTPATIENT)
Dept: FAMILY MEDICINE | Facility: CLINIC | Age: 43
End: 2018-04-05
Payer: COMMERCIAL

## 2018-04-05 VITALS
DIASTOLIC BLOOD PRESSURE: 66 MMHG | BODY MASS INDEX: 39.54 KG/M2 | SYSTOLIC BLOOD PRESSURE: 126 MMHG | WEIGHT: 230.2 LBS | TEMPERATURE: 98.4 F | HEART RATE: 85 BPM | OXYGEN SATURATION: 100 %

## 2018-04-05 DIAGNOSIS — H61.21 IMPACTED CERUMEN OF RIGHT EAR: ICD-10-CM

## 2018-04-05 DIAGNOSIS — R30.0 DYSURIA: Primary | ICD-10-CM

## 2018-04-05 PROBLEM — F25.0 SCHIZOAFFECTIVE DISORDER, BIPOLAR TYPE (H): Status: ACTIVE | Noted: 2018-04-05

## 2018-04-05 LAB
ALBUMIN UR-MCNC: NEGATIVE MG/DL
APPEARANCE UR: CLEAR
BETA HCG QUAL IFA URINE: NEGATIVE
BILIRUB UR QL STRIP: NEGATIVE
COLOR UR AUTO: YELLOW
GLUCOSE UR STRIP-MCNC: NEGATIVE MG/DL
HGB UR QL STRIP: ABNORMAL
KETONES UR STRIP-MCNC: NEGATIVE MG/DL
LEUKOCYTE ESTERASE UR QL STRIP: NEGATIVE
NITRATE UR QL: NEGATIVE
NON-SQ EPI CELLS #/AREA URNS LPF: ABNORMAL /LPF
PH UR STRIP: 6.5 PH (ref 5–7)
RBC #/AREA URNS AUTO: ABNORMAL /HPF
SOURCE: ABNORMAL
SP GR UR STRIP: 1.02 (ref 1–1.03)
UROBILINOGEN UR STRIP-ACNC: 1 EU/DL (ref 0.2–1)
WBC #/AREA URNS AUTO: ABNORMAL /HPF

## 2018-04-05 PROCEDURE — 84703 CHORIONIC GONADOTROPIN ASSAY: CPT | Performed by: PHYSICIAN ASSISTANT

## 2018-04-05 PROCEDURE — 99213 OFFICE O/P EST LOW 20 MIN: CPT | Performed by: PHYSICIAN ASSISTANT

## 2018-04-05 PROCEDURE — 81001 URINALYSIS AUTO W/SCOPE: CPT | Performed by: PHYSICIAN ASSISTANT

## 2018-04-05 PROCEDURE — 87086 URINE CULTURE/COLONY COUNT: CPT | Performed by: PHYSICIAN ASSISTANT

## 2018-04-05 RX ORDER — RISPERIDONE 2 MG/1
2 TABLET ORAL AT BEDTIME
COMMUNITY
Start: 2018-03-16

## 2018-04-05 RX ORDER — CYCLOBENZAPRINE HCL 5 MG
TABLET ORAL
Refills: 1 | COMMUNITY
Start: 2018-03-12 | End: 2020-01-13

## 2018-04-05 RX ORDER — OLANZAPINE 5 MG/1
5 TABLET ORAL AT BEDTIME
COMMUNITY
Start: 2018-03-16

## 2018-04-05 NOTE — LETTER
[facility]  [address] [place]  [address]  [phone]        April 9, 2018    Sadi Bedoya  [address]   [facility] [address]        Dear Sadi,    Your urine did not show an infection       Results for orders placed or performed in visit on 04/05/18   UA with Microscopic reflex to Culture   Result Value Ref Range    Color Urine Yellow     Appearance Urine Clear     Glucose Urine Negative NEG^Negative mg/dL    Bilirubin Urine Negative NEG^Negative    Ketones Urine Negative NEG^Negative mg/dL    Specific Gravity Urine 1.020 1.003 - 1.035    pH Urine 6.5 5.0 - 7.0 pH    Protein Albumin Urine Negative NEG^Negative mg/dL    Urobilinogen Urine 1.0 0.2 - 1.0 EU/dL    Nitrite Urine Negative NEG^Negative    Blood Urine Trace (A) NEG^Negative    Leukocyte Esterase Urine Negative NEG^Negative    Source Midstream Urine     WBC Urine 0 - 5 OTO5^0 - 5 /HPF    RBC Urine 2-5 (A) OTO2^O - 2 /HPF    Squamous Epithelial /LPF Urine Few FEW^Few /LPF   Beta HCG Qual, Urine - [facility] and [place] ([id number])   Result Value Ref Range    Beta HCG Qual IFA Urine Negative NEG^Negative      Urine Culture Aerobic Bacterial   Result Value Ref Range    Specimen Description Midstream Urine     Culture Micro       10,000 to 50,000 colonies/mL  mixed urogenital renata         If you have any questions please call the clinic at [phone].    Sincerely,    Theresa AGUILERA

## 2018-04-05 NOTE — NURSING NOTE
"Chief Complaint   Patient presents with     Ear Problem     Health Maintenance     Tetanus       Initial /66 (BP Location: Right arm, Patient Position: Chair, Cuff Size: Adult Large)  Pulse 85  Temp 98.4  F (36.9  C) (Oral)  Wt 230 lb 3.2 oz (104.4 kg)  SpO2 100%  Breastfeeding? No  BMI 39.54 kg/m2 Estimated body mass index is 39.54 kg/(m^2) as calculated from the following:    Height as of 2/13/18: 5' 3.98\" (1.625 m).    Weight as of this encounter: 230 lb 3.2 oz (104.4 kg).  Medication Reconciliation: complete     DAVIAN Murrieta MA      "

## 2018-04-05 NOTE — PROGRESS NOTES
SUBJECTIVE:   Sadi Bedoya is a 43 year old female who presents to clinic today for the following health issues:      Acute Illness   Acute illness concerns: Ear Problem  Onset: x2 days    Fever: no    Chills/Sweats: YES    Headache (location?): YES    Sinus Pressure:no    Conjunctivitis:  no    Ear Pain: YES: both    Rhinorrhea: no    Congestion: no    Sore Throat: no     Cough: no    Wheeze: no    Decreased Appetite: no    Nausea: no    Vomiting: no    Diarrhea:  no    Dysuria/Freq.: YES- sometimes    Fatigue/Achiness: YES- tired    Sick/Strep Exposure: no     Therapies Tried and outcome: 1 hour ago took tylenol, not helping.     Pain is not worsening. Feels like there is fire in the ears.   Hard to hear.     Having pain with urination. Feels like there is the urge to urinate regularly.   Has been happening for 3-4 months unchanged.   No vaginal discharge. Can not recall last date of LMP, believes it is due soon.     Problem list and histories reviewed & adjusted, as indicated.  Additional history: as documented    Reviewed and updated as needed this visit by clinical staff  Tobacco  Allergies  Meds  Problems  Med Hx  Surg Hx  Fam Hx  Soc Hx        Reviewed and updated as needed this visit by Provider  Allergies  Meds  Problems         ROS:  Constitutional, HEENT, cardiovascular, pulmonary, gi and gu systems are negative, except as otherwise noted.    OBJECTIVE:     /66 (BP Location: Right arm, Patient Position: Chair, Cuff Size: Adult Large)  Pulse 85  Temp 98.4  F (36.9  C) (Oral)  Wt 230 lb 3.2 oz (104.4 kg)  SpO2 100%  Breastfeeding? No  BMI 39.54 kg/m2  Body mass index is 39.54 kg/(m^2).  GENERAL: healthy, alert and no distress  HENT: Right ear canal is completely occluded with dry wax, Left ear canal and TM's normal, nose and mouth without ulcers or lesions  NECK: no adenopathy,   ABDOMEN: soft, mildly tender over the suprapubic area no hepatosplenomegaly, no masses and bowel  sounds normal  BACK: no CVA tenderness, no paralumbar tenderness    Diagnostic Test Results:  Results for orders placed or performed in visit on 04/05/18 (from the past 24 hour(s))   UA with Microscopic reflex to Culture   Result Value Ref Range    Color Urine Yellow     Appearance Urine Clear     Glucose Urine Negative NEG^Negative mg/dL    Bilirubin Urine Negative NEG^Negative    Ketones Urine Negative NEG^Negative mg/dL    Specific Gravity Urine 1.020 1.003 - 1.035    pH Urine 6.5 5.0 - 7.0 pH    Protein Albumin Urine Negative NEG^Negative mg/dL    Urobilinogen Urine 1.0 0.2 - 1.0 EU/dL    Nitrite Urine Negative NEG^Negative    Blood Urine Trace (A) NEG^Negative    Leukocyte Esterase Urine Negative NEG^Negative    Source Midstream Urine     WBC Urine 0 - 5 OTO5^0 - 5 /HPF    RBC Urine 2-5 (A) OTO2^O - 2 /HPF    Squamous Epithelial /LPF Urine Few FEW^Few /LPF   Beta HCG Qual, Urine - [facility] and [place] ([id number])   Result Value Ref Range    Beta HCG Qual IFA Urine Negative NEG^Negative          ASSESSMENT/PLAN:       ICD-10-CM    1. Dysuria R30.0 UA with Microscopic reflex to Culture     Beta HCG Qual, Urine - [facility] and [place] ([id number])     Urine Culture Aerobic Bacterial   2. Impacted cerumen of right ear H61.21 carbamide peroxide (DEBROX) 6.5 % otic solution   Will give debrox to soften the wax and then have her see the RN for an ear lavage next week.   Urine with trace blood will send for culture.       FUTURE APPOINTMENTS:       - Follow-up for annual visit or as needed    Theresa Vanegas PA-C  [facility]

## 2018-04-05 NOTE — MR AVS SNAPSHOT
"              After Visit Summary   4/5/2018    aSdi Bedoya    MRN: [id number]           Patient Information     Date Of Birth          1975        Visit Information        Provider Department      4/5/2018 2:20 PM Theresa Vanegas PA-C; L[facility]        Today's Diagnoses     Dysuria    -  1    Impacted cerumen of right ear           Follow-ups after your visit        Your next 10 appointments already scheduled     Apr 13, 2018 11:00 AM CDT   Nurse Only with CP RN   [facility]    [address] [address]   [phone]           [facility] need to be scheduled for 60 mins *Prolia injections with the RN*              Who to contact     If you have questions or need follow up information about today's clinic visit or your schedule please contact [facility] directly at [phone].  Normal or non-critical lab and imaging results will be communicated to you by MyChart, letter or phone within 4 business days after the clinic has received the results. If you do not hear from us within 7 days, please contact the clinic through MyChart or phone. If you have a critical or abnormal lab result, we will notify you by phone as soon as possible.  Submit refill requests through [employer] or call your pharmacy and they will forward the refill request to us. Please allow 3 business days for your refill to be completed.          Additional Information About Your Visit        [employer] Information     [employer] lets you send messages to your doctor, view your test results, renew your prescriptions, schedule appointments and more. To sign up, go to [email or web address] . Click on \"Log in\" on the left side of the screen, which will take you to the Welcome page. Then click on \"Sign up Now\" on the right side of the page.     You will be asked to enter the access code " listed below, as well as some personal information. Please follow the directions to create your username and password.     Your access code is: [email or web address]  Expires: 2018  3:30 PM     Your access code will  in 90 days. If you need help or a new code, please call your [facility] or [phone].        Care EveryWhere ID     This is your Care EveryWhere ID. This could be used by other organizations to access your [facility] medical records  [id number]        Your Vitals Were     Pulse Temperature Pulse Oximetry Breastfeeding? BMI (Body Mass Index)       85 98.4  F (36.9  C) (Oral) 100% No 39.54 kg/m2        Blood Pressure from Last 3 Encounters:   18 126/66   18 112/75   10/26/17 110/67    Weight from Last 3 Encounters:   18 230 lb 3.2 oz (104.4 kg)   18 224 lb 3.2 oz (101.7 kg)   10/26/17 217 lb (98.4 kg)              We Performed the Following     Beta HCG Qual, Urine - [facility] and [place] ([id number])     UA with Microscopic reflex to Culture     Urine Culture Aerobic Bacterial          Today's Medication Changes          These changes are accurate as of 18  3:23 PM.  If you have any questions, ask your nurse or doctor.               Start taking these medicines.        Dose/Directions    carbamide peroxide 6.5 % otic solution   Commonly known as:  DEBROX   Used for:  Impacted cerumen of right ear   Started by:  Theresa Vanegas, CHANI        Dose:  10 drop   Place 10 drops into the right ear 2 times daily   Quantity:  30 mL   Refills:  0            Where to get your medicines      These medications were sent to [id number] - [place], [place] - [address] AT [facility]  [address]  [address] [place] [facility] [address]     Phone:  [phone]     carbamide peroxide 6.5 % otic solution                Primary Care Provider Office Phone # Fax #    Emmie Kal King [phone] [address] [facility]  [address]        Equal Access to Services     [facility]: Hadii deven torre ida Nice, waaxda luqadaha, qaybta kaalmada may, waxjuan f dory thompsonjonathanrubin stevens. So [facility] [phone].    ATENCIÓN: Si habla español, tiene a rangel disposición servicios gratuitos de asistencia lingüística. Llame al [phone].    We comply with applicable federal civil rights laws and [place] laws. We do not discriminate on the basis of race, color, national origin, age, disability, sex, sexual orientation, or gender identity.            Thank you!     Thank you for choosing [facility]  for your care. Our goal is always to provide you with excellent care. Hearing back from our patients is one way we can continue to improve our services. Please take a few minutes to complete the written survey that you may receive in the mail after your visit with us. Thank you!             Your Updated Medication List - Protect others around you: Learn how to safely use, store and throw away your medicines at www.disposemymeds.org.          This list is accurate as of 4/5/18  3:23 PM.  Always use your most recent med list.                   Brand Name Dispense Instructions for use Diagnosis    carbamide peroxide 6.5 % otic solution    DEBROX    30 mL    Place 10 drops into the right ear 2 times daily    Impacted cerumen of right ear       cyclobenzaprine 5 MG tablet    FLEXERIL          naproxen 500 MG tablet    NAPROSYN    60 tablet    Take 1 tablet (500 mg) by mouth 2 times daily as needed for moderate pain    Inflammatory arthritis, Primary osteoarthritis involving multiple joints       OLANZapine 5 MG tablet    zyPREXA          * order for DME     2 each    Equipment being ordered: compression stockings knee high moderate compression    Peripheral edema       * order for DME     1 each    Equipment being ordered: shoes with arch support and heal cushioning    Bilateral leg pain, Plantar fasciitis       prenatal  multivitamin plus iron 27-0.8 MG Tabs per tablet      Take 1 tablet by mouth daily        risperiDONE 2 MG tablet    risperDAL     Take 2 mg by mouth        TYLENOL PO           vitamin D 59430 UNIT capsule    ERGOCALCIFEROL     Take 50,000 Units by mouth daily        * Notice:  This list has 2 medication(s) that are the same as other medications prescribed for you. Read the directions carefully, and ask your doctor or other care provider to review them with you.

## 2018-04-06 LAB
BACTERIA SPEC CULT: NORMAL
SPECIMEN SOURCE: NORMAL

## 2019-12-07 ENCOUNTER — HOSPITAL ENCOUNTER (EMERGENCY)
Facility: CLINIC | Age: 44
Discharge: HOME | End: 2019-12-08
Attending: INTERNAL MEDICINE | Admitting: INTERNAL MEDICINE
Payer: COMMERCIAL

## 2019-12-07 ENCOUNTER — APPOINTMENT (OUTPATIENT)
Dept: GENERAL RADIOLOGY | Facility: CLINIC | Age: 44
End: 2019-12-07
Attending: INTERNAL MEDICINE
Payer: COMMERCIAL

## 2019-12-07 DIAGNOSIS — R10.84 ABDOMINAL PAIN, GENERALIZED: ICD-10-CM

## 2019-12-07 DIAGNOSIS — N83.201 RIGHT OVARIAN CYST: ICD-10-CM

## 2019-12-07 LAB
ALBUMIN SERPL-MCNC: 2.7 G/DL (ref 3.4–5)
ALBUMIN UR-MCNC: NEGATIVE MG/DL
ALP SERPL-CCNC: 79 U/L (ref 40–150)
ALT SERPL W P-5'-P-CCNC: 16 U/L (ref 0–50)
ANION GAP SERPL CALCULATED.3IONS-SCNC: 5 MMOL/L (ref 3–14)
APPEARANCE UR: CLEAR
AST SERPL W P-5'-P-CCNC: 15 U/L (ref 0–45)
BACTERIA #/AREA URNS HPF: ABNORMAL /HPF
BASOPHILS # BLD AUTO: 0 10E9/L (ref 0–0.2)
BASOPHILS NFR BLD AUTO: 0.2 %
BILIRUB SERPL-MCNC: 0.5 MG/DL (ref 0.2–1.3)
BILIRUB UR QL STRIP: NEGATIVE
BUN SERPL-MCNC: 10 MG/DL (ref 7–30)
CALCIUM SERPL-MCNC: 7.8 MG/DL (ref 8.5–10.1)
CAOX CRY #/AREA URNS HPF: ABNORMAL /HPF
CHLORIDE SERPL-SCNC: 108 MMOL/L (ref 94–109)
CO2 BLD-SCNC: 24 MMOL/L (ref 21–28)
CO2 SERPL-SCNC: 25 MMOL/L (ref 20–32)
COLOR UR AUTO: YELLOW
CREAT SERPL-MCNC: 0.46 MG/DL (ref 0.52–1.04)
CRP SERPL-MCNC: 44.5 MG/L (ref 0–8)
DIFFERENTIAL METHOD BLD: ABNORMAL
EOSINOPHIL # BLD AUTO: 0.2 10E9/L (ref 0–0.7)
EOSINOPHIL NFR BLD AUTO: 2.5 %
ERYTHROCYTE [DISTWIDTH] IN BLOOD BY AUTOMATED COUNT: 12 % (ref 10–15)
GFR SERPL CREATININE-BSD FRML MDRD: >90 ML/MIN/{1.73_M2}
GLUCOSE SERPL-MCNC: 98 MG/DL (ref 70–99)
GLUCOSE UR STRIP-MCNC: NEGATIVE MG/DL
HCG SERPL QL: NEGATIVE
HCG UR QL: NEGATIVE
HCT VFR BLD AUTO: 32.2 % (ref 35–47)
HGB BLD-MCNC: 10.6 G/DL (ref 11.7–15.7)
HGB UR QL STRIP: ABNORMAL
IMM GRANULOCYTES # BLD: 0 10E9/L (ref 0–0.4)
IMM GRANULOCYTES NFR BLD: 0.2 %
INR PPP: 1.23 (ref 0.86–1.14)
INTERNAL QC OK POCT: YES
KETONES UR STRIP-MCNC: NEGATIVE MG/DL
LACTATE BLD-SCNC: 0.3 MMOL/L (ref 0.7–2.1)
LEUKOCYTE ESTERASE UR QL STRIP: NEGATIVE
LIPASE SERPL-CCNC: 72 U/L (ref 73–393)
LYMPHOCYTES # BLD AUTO: 2.2 10E9/L (ref 0.8–5.3)
LYMPHOCYTES NFR BLD AUTO: 33.7 %
MCH RBC QN AUTO: 31.3 PG (ref 26.5–33)
MCHC RBC AUTO-ENTMCNC: 32.9 G/DL (ref 31.5–36.5)
MCV RBC AUTO: 95 FL (ref 78–100)
MONOCYTES # BLD AUTO: 0.6 10E9/L (ref 0–1.3)
MONOCYTES NFR BLD AUTO: 9.1 %
NEUTROPHILS # BLD AUTO: 3.5 10E9/L (ref 1.6–8.3)
NEUTROPHILS NFR BLD AUTO: 54.3 %
NITRATE UR QL: NEGATIVE
NRBC # BLD AUTO: 0 10*3/UL
NRBC BLD AUTO-RTO: 0 /100
PCO2 BLD: 39 MM HG (ref 35–45)
PH BLD: 7.41 PH (ref 7.35–7.45)
PH UR STRIP: 5.5 PH (ref 5–7)
PLATELET # BLD AUTO: 295 10E9/L (ref 150–450)
PO2 BLD: 140 MM HG (ref 80–105)
POTASSIUM SERPL-SCNC: 3.5 MMOL/L (ref 3.4–5.3)
PROT SERPL-MCNC: 7.2 G/DL (ref 6.8–8.8)
RBC # BLD AUTO: 3.39 10E12/L (ref 3.8–5.2)
RBC #/AREA URNS AUTO: 3 /HPF (ref 0–2)
SAO2 % BLDA FROM PO2: 99 % (ref 92–100)
SODIUM SERPL-SCNC: 138 MMOL/L (ref 133–144)
SOURCE: ABNORMAL
SP GR UR STRIP: 1.02 (ref 1–1.03)
SQUAMOUS #/AREA URNS AUTO: 3 /HPF (ref 0–1)
UROBILINOGEN UR STRIP-MCNC: NORMAL MG/DL (ref 0–2)
WBC # BLD AUTO: 6.5 10E9/L (ref 4–11)
WBC #/AREA URNS AUTO: 1 /HPF (ref 0–5)

## 2019-12-07 PROCEDURE — 85025 COMPLETE CBC W/AUTO DIFF WBC: CPT | Performed by: INTERNAL MEDICINE

## 2019-12-07 PROCEDURE — 80053 COMPREHEN METABOLIC PANEL: CPT | Performed by: INTERNAL MEDICINE

## 2019-12-07 PROCEDURE — 81001 URINALYSIS AUTO W/SCOPE: CPT | Performed by: INTERNAL MEDICINE

## 2019-12-07 PROCEDURE — 83690 ASSAY OF LIPASE: CPT | Performed by: INTERNAL MEDICINE

## 2019-12-07 PROCEDURE — 82803 BLOOD GASES ANY COMBINATION: CPT

## 2019-12-07 PROCEDURE — 83605 ASSAY OF LACTIC ACID: CPT

## 2019-12-07 PROCEDURE — 84703 CHORIONIC GONADOTROPIN ASSAY: CPT | Performed by: INTERNAL MEDICINE

## 2019-12-07 PROCEDURE — 86140 C-REACTIVE PROTEIN: CPT | Performed by: INTERNAL MEDICINE

## 2019-12-07 PROCEDURE — 81025 URINE PREGNANCY TEST: CPT | Performed by: INTERNAL MEDICINE

## 2019-12-07 PROCEDURE — 74019 RADEX ABDOMEN 2 VIEWS: CPT

## 2019-12-07 PROCEDURE — 85610 PROTHROMBIN TIME: CPT | Performed by: INTERNAL MEDICINE

## 2019-12-07 PROCEDURE — 99285 EMERGENCY DEPT VISIT HI MDM: CPT | Performed by: INTERNAL MEDICINE

## 2019-12-07 PROCEDURE — 25000132 ZZH RX MED GY IP 250 OP 250 PS 637: Performed by: INTERNAL MEDICINE

## 2019-12-07 PROCEDURE — 99285 EMERGENCY DEPT VISIT HI MDM: CPT | Mod: Z6 | Performed by: INTERNAL MEDICINE

## 2019-12-07 RX ORDER — SODIUM CHLORIDE 9 MG/ML
1000 INJECTION, SOLUTION INTRAVENOUS CONTINUOUS
Status: DISCONTINUED | OUTPATIENT
Start: 2019-12-07 | End: 2019-12-08 | Stop reason: HOSPADM

## 2019-12-07 RX ORDER — SUCRALFATE ORAL 1 G/10ML
1 SUSPENSION ORAL ONCE
Status: DISCONTINUED | OUTPATIENT
Start: 2019-12-07 | End: 2019-12-08 | Stop reason: HOSPADM

## 2019-12-07 NOTE — ED AVS SNAPSHOT
[facility], [place], Emergency Department  [address]  [facility] [place] [address]  Phone:  [phone]  Fax:  [phone]                                    Sadi Bedoya   MRN: [id number]    Department:  [facility], Emergency Department   Date of Visit:  12/7/2019           After Visit Summary Signature Page    I have received my discharge instructions, and my questions have been answered. I have discussed any challenges I see with this plan with the nurse or doctor.    ..........................................................................................................................................  Patient/Patient Representative Signature      ..........................................................................................................................................  Patient Representative Print Name and Relationship to Patient    ..................................................               ................................................  Date                                   Time    ..........................................................................................................................................  Reviewed by Signature/Title    ...................................................              ..............................................  Date                                               Time          22EPIC Rev 08/18

## 2019-12-08 ENCOUNTER — APPOINTMENT (OUTPATIENT)
Dept: CT IMAGING | Facility: CLINIC | Age: 44
End: 2019-12-08
Attending: INTERNAL MEDICINE
Payer: COMMERCIAL

## 2019-12-08 ENCOUNTER — APPOINTMENT (OUTPATIENT)
Dept: ULTRASOUND IMAGING | Facility: CLINIC | Age: 44
End: 2019-12-08
Attending: EMERGENCY MEDICINE
Payer: COMMERCIAL

## 2019-12-08 VITALS
DIASTOLIC BLOOD PRESSURE: 69 MMHG | SYSTOLIC BLOOD PRESSURE: 104 MMHG | TEMPERATURE: 96.8 F | WEIGHT: 248.44 LBS | RESPIRATION RATE: 16 BRPM | BODY MASS INDEX: 42.68 KG/M2 | OXYGEN SATURATION: 98 % | HEART RATE: 84 BPM

## 2019-12-08 PROCEDURE — 96374 THER/PROPH/DIAG INJ IV PUSH: CPT | Mod: 59 | Performed by: INTERNAL MEDICINE

## 2019-12-08 PROCEDURE — 25000128 H RX IP 250 OP 636: Performed by: INTERNAL MEDICINE

## 2019-12-08 PROCEDURE — 96361 HYDRATE IV INFUSION ADD-ON: CPT | Performed by: INTERNAL MEDICINE

## 2019-12-08 PROCEDURE — 74177 CT ABD & PELVIS W/CONTRAST: CPT

## 2019-12-08 PROCEDURE — 93976 VASCULAR STUDY: CPT

## 2019-12-08 PROCEDURE — 25800030 ZZH RX IP 258 OP 636: Performed by: INTERNAL MEDICINE

## 2019-12-08 RX ORDER — PANTOPRAZOLE SODIUM 40 MG/1
40 TABLET, DELAYED RELEASE ORAL DAILY
Qty: 30 TABLET | Refills: 0 | Status: SHIPPED | OUTPATIENT
Start: 2019-12-08 | End: 2020-01-07

## 2019-12-08 RX ORDER — IOPAMIDOL 755 MG/ML
100 INJECTION, SOLUTION INTRAVASCULAR ONCE
Status: COMPLETED | OUTPATIENT
Start: 2019-12-08 | End: 2019-12-08

## 2019-12-08 RX ORDER — SUCRALFATE 1 G/1
1 TABLET ORAL 4 TIMES DAILY
Qty: 40 TABLET | Refills: 0 | Status: SHIPPED | OUTPATIENT
Start: 2019-12-08 | End: 2020-01-13

## 2019-12-08 RX ADMIN — RANITIDINE HYDROCHLORIDE 50 MG: 25 INJECTION INTRAMUSCULAR; INTRAVENOUS at 00:58

## 2019-12-08 RX ADMIN — IOPAMIDOL 100 ML: 755 INJECTION, SOLUTION INTRAVENOUS at 00:34

## 2019-12-08 RX ADMIN — SODIUM CHLORIDE 1000 ML: 9 INJECTION, SOLUTION INTRAVENOUS at 00:29

## 2019-12-08 ASSESSMENT — ENCOUNTER SYMPTOMS
NECK PAIN: 0
LIGHT-HEADEDNESS: 0
WEAKNESS: 0
BACK PAIN: 0
CHILLS: 0
ADENOPATHY: 0
NUMBNESS: 0
SHORTNESS OF BREATH: 0
NAUSEA: 0
WHEEZING: 0
FEVER: 0
ABDOMINAL PAIN: 1
DIARRHEA: 0
HEADACHES: 0
DYSURIA: 0
VOMITING: 0
CONFUSION: 0
COUGH: 0

## 2019-12-08 NOTE — ED NOTES
Emergency Department Patient Sign-out       Brief HPI and ED course:  Patient is a 44 year old female signed out to me by Dr. Lance.  See initial ED Provider note for details of the presentation. In brief, 2 days abdominal pain. Labs reassuring. Pending CT abdomen/pelvis.     Vitals:   Patient Vitals for the past 24 hrs:   BP Temp Temp src Pulse Resp SpO2 Weight   12/07/19 2043 121/72 96.8  F (36  C) Oral 72 16 98 % 112.7 kg (248 lb 7 oz)       Received Sign-out Plan:    Pending studies include: CT      Plan:   - f/u CT, if unremarkable then likely discharge with gastritis treatment    Events after assuming care:  After care was assumed, a focused history and physical was performed. Agree with findings relayed by previous provider. [employer] video  utilized.     CT abdomen/pelvis demonstrated right ovarian/adnexal cyst, otherwise normal exam without evidence of other pathology. Pelvic ultrasound showed normal blood flow in the ovary, re-demonstrated the cyst. Patient's location of pain is periumbilical, higher than would be expected if the cyst is the source of the pain.     After counseling on the diagnosis, work-up, and treatment plan, the patient was discharged to home. The patient was advised to follow-up with her PCP in a few days, gynecology referral placed for further evaluation of the adnexal cyst. The patient was advised to return to the ED if worsening symptoms, or if there are any urgent/life-threatening concerns.     Final diagnoses:   Abdominal pain, generalized   Right ovarian cyst       --  Tobi Salinas MD   Emergency Medicine        Tobi Salinas MD  12/08/19 0811

## 2019-12-08 NOTE — ED PROVIDER NOTES
History     Chief Complaint   Patient presents with     Abdominal Pain     Pt c/o stomach pain since last night     HPI  Sadi Bedoya is a 44 year old female who presents with 2 days of periumbilical abdominal pain. She has no nausea, vomiting, fever, chills, cough, chest pain or dysuria. She has not had stomach pain in the past. She has had prior cholecystectomy.    History reviewed. No pertinent past medical history.    Past Surgical History:   Procedure Laterality Date     CHOLECYSTECTOMY         Family History   Problem Relation Age of Onset     Liver Disease Mother        Social History     Tobacco Use     Smoking status: Never Smoker     Smokeless tobacco: Never Used   Substance Use Topics     Alcohol use: No         I have reviewed the Medications, Allergies, Past Medical and Surgical History, and Social History in the Epic system.    Review of Systems   Constitutional: Negative for chills and fever.   HENT: Negative for congestion.    Eyes: Negative for visual disturbance.   Respiratory: Negative for cough, shortness of breath and wheezing.    Cardiovascular: Negative for chest pain and leg swelling.   Gastrointestinal: Positive for abdominal pain. Negative for diarrhea, nausea and vomiting.   Genitourinary: Negative for dysuria.   Musculoskeletal: Negative for back pain and neck pain.   Skin: Negative for rash.   Neurological: Negative for weakness, light-headedness, numbness and headaches.   Hematological: Negative for adenopathy.   Psychiatric/Behavioral: Negative for confusion.       Physical Exam   BP: 121/72  Pulse: 72  Temp: 96.8  F (36  C)  Resp: 16  Weight: 112.7 kg (248 lb 7 oz)  SpO2: 98 %      Physical Exam  Vitals signs and nursing note reviewed.   Constitutional:       Appearance: She is well-developed.   HENT:      Head: Normocephalic and atraumatic.      Mouth/Throat:      Mouth: Mucous membranes are moist.   Eyes:      General: No scleral icterus.     Extraocular Movements:  Extraocular movements intact.      Pupils: Pupils are equal, round, and reactive to light.   Cardiovascular:      Rate and Rhythm: Normal rate and regular rhythm.      Heart sounds: No murmur.   Pulmonary:      Effort: Pulmonary effort is normal.      Breath sounds: Normal breath sounds.   Abdominal:      General: Abdomen is protuberant.      Tenderness: There is generalized abdominal tenderness and tenderness in the periumbilical area. There is no right CVA tenderness or left CVA tenderness.      Hernia: No hernia is present.   Musculoskeletal:      Right lower leg: No edema.      Left lower leg: No edema.   Skin:     General: Skin is warm and dry.   Neurological:      General: No focal deficit present.      Mental Status: She is alert and oriented to person, place, and time.      Cranial Nerves: No cranial nerve deficit.   Psychiatric:         Mood and Affect: Mood normal.         Behavior: Behavior normal.         ED Course        Procedures        Labs/Imaging    Results for orders placed or performed during the hospital encounter of 12/07/19 (from the past 24 hour(s))   UA with Microscopic reflex to Culture   Result Value Ref Range    Color Urine Yellow     Appearance Urine Clear     Glucose Urine Negative NEG^Negative mg/dL    Bilirubin Urine Negative NEG^Negative    Ketones Urine Negative NEG^Negative mg/dL    Specific Gravity Urine 1.024 1.003 - 1.035    Blood Urine Trace (A) NEG^Negative    pH Urine 5.5 5.0 - 7.0 pH    Protein Albumin Urine Negative NEG^Negative mg/dL    Urobilinogen mg/dL Normal 0.0 - 2.0 mg/dL    Nitrite Urine Negative NEG^Negative    Leukocyte Esterase Urine Negative NEG^Negative    Source Unspecified Urine     WBC Urine 1 0 - 5 /HPF    RBC Urine 3 (H) 0 - 2 /HPF    Bacteria Urine Few (A) NEG^Negative /HPF    Squamous Epithelial /HPF Urine 3 (H) 0 - 1 /HPF    Calcium Oxalate Few (A) NEG^Negative /HPF   hCG qual urine POCT   Result Value Ref Range    HCG Qual Urine Negative neg     Internal QC OK Yes    CBC with platelets differential   Result Value Ref Range    WBC 6.5 4.0 - 11.0 10e9/L    RBC Count 3.39 (L) 3.8 - 5.2 10e12/L    Hemoglobin 10.6 (L) 11.7 - 15.7 g/dL    Hematocrit 32.2 (L) 35.0 - 47.0 %    MCV 95 78 - 100 fl    MCH 31.3 26.5 - 33.0 pg    MCHC 32.9 31.5 - 36.5 g/dL    RDW 12.0 10.0 - 15.0 %    Platelet Count 295 150 - 450 10e9/L    Diff Method Automated Method     % Neutrophils 54.3 %    % Lymphocytes 33.7 %    % Monocytes 9.1 %    % Eosinophils 2.5 %    % Basophils 0.2 %    % Immature Granulocytes 0.2 %    Nucleated RBCs 0 0 /100    Absolute Neutrophil 3.5 1.6 - 8.3 10e9/L    Absolute Lymphocytes 2.2 0.8 - 5.3 10e9/L    Absolute Monocytes 0.6 0.0 - 1.3 10e9/L    Absolute Eosinophils 0.2 0.0 - 0.7 10e9/L    Absolute Basophils 0.0 0.0 - 0.2 10e9/L    Abs Immature Granulocytes 0.0 0 - 0.4 10e9/L    Absolute Nucleated RBC 0.0    Comprehensive metabolic panel   Result Value Ref Range    Sodium 138 133 - 144 mmol/L    Potassium 3.5 3.4 - 5.3 mmol/L    Chloride 108 94 - 109 mmol/L    Carbon Dioxide 25 20 - 32 mmol/L    Anion Gap 5 3 - 14 mmol/L    Glucose 98 70 - 99 mg/dL    Urea Nitrogen 10 7 - 30 mg/dL    Creatinine 0.46 (L) 0.52 - 1.04 mg/dL    GFR Estimate >90 >60 mL/min/[1.73_m2]    GFR Estimate If Black >90 >60 mL/min/[1.73_m2]    Calcium 7.8 (L) 8.5 - 10.1 mg/dL    Bilirubin Total 0.5 0.2 - 1.3 mg/dL    Albumin 2.7 (L) 3.4 - 5.0 g/dL    Protein Total 7.2 6.8 - 8.8 g/dL    Alkaline Phosphatase 79 40 - 150 U/L    ALT 16 0 - 50 U/L    AST 15 0 - 45 U/L   Lipase   Result Value Ref Range    Lipase 72 (L) 73 - 393 U/L   INR   Result Value Ref Range    INR 1.23 (H) 0.86 - 1.14   CRP inflammation   Result Value Ref Range    CRP Inflammation 44.5 (H) 0.0 - 8.0 mg/L   HCG qualitative Blood   Result Value Ref Range    HCG Qualitative Serum Negative NEG^Negative   ISTAT gases lactate art POCT   Result Value Ref Range    pH Arterial 7.41 7.35 - 7.45 pH    pCO2 Arterial 39 35 - 45 mm Hg     pO2 Arterial 140 (H) 80 - 105 mm Hg    Bicarbonate Arterial 24 21 - 28 mmol/L    O2 Sat Arterial 99 92 - 100 %    Lactic Acid 0.3 (L) 0.7 - 2.1 mmol/L   XR Abdomen 2 Views    Narrative    EXAM: XR ABDOMEN 2 VW  LOCATION: [facility]  DATE/TIME: 12/7/2019 11:15 PM    INDICATION: Periumbilical abdominal pain.  COMPARISON: None.      Impression    IMPRESSION: Supine and upright views. The bowel gas pattern is within normal limits. No free air or air-fluid levels. Moderate amount of stool in the colon.          Assessments & Plan (with Medical Decision Making)   Impression:  Middle aged female with prior history of cholecystectomy presents with 2 days of generalized abdominal pain which is constant. She has no fever, chills, nausea, vomiting, diarrhea. Lactate is normal. LFTs and lipase are normal. CBC is normal. CRP is moderately elevated. Abdominal CT has non specific bowel gas pattern. She is not pregnant. I will obtain CT to exclude appendicitis, SBO etc. If normal she can likely be discharged on medication for gastritis/PUD.    I have reviewed the nursing notes.    I have reviewed the findings, diagnosis, plan and need for follow up with the patient.    New Prescriptions    No medications on file       Final diagnoses:   Abdominal pain, generalized       12/7/2019   [facility], EMERGENCY DEPARTMENT     Bryan Lance MD  12/08/19 0011       Bryan Lance MD  12/08/19 0042

## 2019-12-08 NOTE — ED NOTES
Attempted to place PIV with ultrasound. Pulsitile blood from PIV catheter upon placement, likely placed in artery. Removed and pressure held for 5 minutes. No signs of bleeding currently.

## 2019-12-08 NOTE — DISCHARGE INSTRUCTIONS
Start Protonix 40 mg daily. Carafate 1 tablet 4 times/ day.    Follow up [facility] in 2-5 days.    Return to the ED if you are having fever, worsening pain symptoms, or any urgent/life-threatening concerns.

## 2020-01-13 ENCOUNTER — OFFICE VISIT (OUTPATIENT)
Dept: OBGYN | Facility: CLINIC | Age: 45
End: 2020-01-13
Attending: EMERGENCY MEDICINE
Payer: COMMERCIAL

## 2020-01-13 VITALS
BODY MASS INDEX: 41.71 KG/M2 | HEIGHT: 64 IN | DIASTOLIC BLOOD PRESSURE: 69 MMHG | HEART RATE: 65 BPM | SYSTOLIC BLOOD PRESSURE: 100 MMHG | WEIGHT: 244.3 LBS

## 2020-01-13 DIAGNOSIS — N91.4 SECONDARY OLIGOMENORRHEA: ICD-10-CM

## 2020-01-13 DIAGNOSIS — N83.201 CYST OF RIGHT OVARY: Primary | ICD-10-CM

## 2020-01-13 PROCEDURE — G0463 HOSPITAL OUTPT CLINIC VISIT: HCPCS | Mod: ZF

## 2020-01-13 ASSESSMENT — MIFFLIN-ST. JEOR: SCORE: 1742.82
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
## 2020-01-13 NOTE — PROGRESS NOTES
[facility] - [facility]stetric[facility] Gyne[facility]    2020    Chief Complaint: ***    History of Present Illness:  Sadi Bedoya is a 45 year old  female who presents with ***    Gynecologic History:  - LMP: No LMP recorded. (Menstrual status: Irregular Periods).  - Menses: Menarche at ***, cycles q***, lasting *** days, with *** flow, ***clots, ***cramps  - Contraception: ***  - Pap Smears: Last pap 3/1/2019 NILM, HPV neg; *** abnormal paps  - HPV vaccine: ***  - Sexual Activity: ***  - Sexual Concerns: *** sexual dysfunction, *** dyspareunia, *** low libido, *** difficulty reaching orgasm  - Hx STIs: ***  - Hx UTIs: ***    Obstetric History:  OB History    Para Term  AB Living   4 3 3 0 1 3   SAB TAB Ectopic Multiple Live Births   1 0 0 0 3      # Outcome Date GA Lbr Brett/2nd Weight Sex Delivery Anes PTL Lv   4 Term  40w0d  3.629 kg (8 lb) F    ALEA   3 SAB 2010              Birth Comments: medical management   2 Term  40w0d   M    ALEA   1 Term  40w0d   M    ALEA       ROS:  A 10 point review of systems was conducted and negative except as noted in HPI    Problem List:  Patient Active Problem List   Diagnosis     CARDIOVASCULAR SCREENING; LDL GOAL LESS THAN 160     Female infertility of other specified origin     Varicose veins of both lower extremities     Schizoaffective disorder, bipolar type (H)     Current Medications:  Current Outpatient Medications   Medication     Acetaminophen (TYLENOL PO)     carbamide peroxide (DEBROX) 6.5 % otic solution     cyclobenzaprine (FLEXERIL) 5 MG tablet     naproxen (NAPROSYN) 500 MG tablet     OLANZapine (ZYPREXA) 5 MG tablet     order for DME     order for DME     Prenatal Vit-Fe Fumarate-FA (PRENATAL MULTIVITAMIN PLUS IRON) 27-0.8 MG TABS per tablet     risperiDONE (RISPERDAL) 2 MG tablet     vitamin D (ERGOCALCIFEROL) 86783 UNIT capsule     No current facility-administered medications for this visit.        Past Medical History:  No  past medical history on file.    Past Surgical History:  Past Surgical History:   Procedure Laterality Date     CHOLECYSTECTOMY         Allergies:  Allergies   Allergen Reactions     Alcohol [Ethanol]      Gelatin      No Known Drug Allergy      Social History:  Social History     Socioeconomic History     Marital status:      Spouse name: Not on file     Number of children: 3     Years of education: Not on file     Highest education level: Not on file   Occupational History     Not on file   Social Needs     Financial resource strain: Not on file     Food insecurity:     Worry: Not on file     Inability: Not on file     Transportation needs:     Medical: Not on file     Non-medical: Not on file   Tobacco Use     Smoking status: Never Smoker     Smokeless tobacco: Never Used   Substance and Sexual Activity     Alcohol use: No     Drug use: No     Sexual activity: Yes     Partners: Male     Birth control/protection: None   Lifestyle     Physical activity:     Days per week: Not on file     Minutes per session: Not on file     Stress: Not on file   Relationships     Social connections:     Talks on phone: Not on file     Gets together: Not on file     Attends Gnosticism service: Not on file     Active member of club or organization: Not on file     Attends meetings of clubs or organizations: Not on file     Relationship status: Not on file     Intimate partner violence:     Fear of current or ex partner: Not on file     Emotionally abused: Not on file     Physically abused: Not on file     Forced sexual activity: Not on file   Other Topics Concern     Parent/sibling w/ CABG, MI or angioplasty before 65F 55M? Not Asked   Social History Narrative     Not on file       Family History:  Family History   Problem Relation Age of Onset     Liver Disease Mother    Denies family history of breast, ovarian, uterine, or colon cancers***.   Family history reviewed, noncontributory    Exam:  There were no vitals taken for  "this visit.  Constitutional: {GENERAL APPEARANCE:758306::\"healthy\",\"alert\",\"no distress\"}  Neck: {NECK EXAM:304::\"Neck supple. No adenopathy. Thyroid symmetric, normal size,\",\"Carotids without bruits.\"}  Cardiovascular: {HEART EXAM:501::\"negative\",\"PMI normal. No lifts, heaves, or thrills. RRR. No murmurs, clicks gallops or rub\"}  Respiratory: {LUNG EXAM:401::\"negative\",\"Percussion normal. Good diaphragmatic excursion. Lungs clear\"}  Breast Exam: {Breast Ex:1219}.  Gastrointestinal: {ABDOMEN EXAM:601::\"Abdomen soft, non-tender. BS normal. No masses, organomegaly\"}  Pelvic Exam - {PELVIC EXAM:709::\"External genitalia and vagina normal. Bimanual and rectovaginal normal.\"}  Skin: {[facility] SKIN EXAM:592333::\"no suspicious lesions or rashes\"}  Psychiatric: {[facility] PATIENT PSYCH APPEARANCE:805709::\"mentation appears normal\",\"affect normal/bright\"}    Labs:  ***    Imaging:  ***    Assessment/Plan:  45 year old  with *** and right ovarian complex cyst measuring 6.5 x 7 x 4 cm     #***: ***    #***: ***    RTC in ***    Seen and discussed with  ***    Dudley Olivares MD  Ob/Gyn Resident, PGY-1  20 10:45 AM    "
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
## 2020-07-25 ENCOUNTER — HOSPITAL ENCOUNTER (EMERGENCY)
Facility: CLINIC | Age: 45
Discharge: HOME | End: 2020-07-26
Attending: EMERGENCY MEDICINE | Admitting: EMERGENCY MEDICINE
Payer: COMMERCIAL

## 2020-07-25 ENCOUNTER — APPOINTMENT (OUTPATIENT)
Dept: CT IMAGING | Facility: CLINIC | Age: 45
End: 2020-07-25
Attending: EMERGENCY MEDICINE
Payer: COMMERCIAL

## 2020-07-25 DIAGNOSIS — R11.2 NAUSEA AND VOMITING, INTRACTABILITY OF VOMITING NOT SPECIFIED, UNSPECIFIED VOMITING TYPE: ICD-10-CM

## 2020-07-25 DIAGNOSIS — N39.0 ACUTE UTI: ICD-10-CM

## 2020-07-25 DIAGNOSIS — N83.8 OVARIAN MASS, RIGHT: ICD-10-CM

## 2020-07-25 DIAGNOSIS — R10.84 ABDOMINAL PAIN, GENERALIZED: ICD-10-CM

## 2020-07-25 LAB
ALBUMIN SERPL-MCNC: 3.2 G/DL (ref 3.4–5)
ALBUMIN UR-MCNC: 30 MG/DL
ALP SERPL-CCNC: 80 U/L (ref 40–150)
ALT SERPL W P-5'-P-CCNC: 19 U/L (ref 0–50)
ANION GAP SERPL CALCULATED.3IONS-SCNC: 7 MMOL/L (ref 3–14)
APPEARANCE UR: ABNORMAL
AST SERPL W P-5'-P-CCNC: 20 U/L (ref 0–45)
BACTERIA #/AREA URNS HPF: ABNORMAL /HPF
BASOPHILS # BLD AUTO: 0 10E9/L (ref 0–0.2)
BASOPHILS NFR BLD AUTO: 0.2 %
BILIRUB SERPL-MCNC: 0.8 MG/DL (ref 0.2–1.3)
BILIRUB UR QL STRIP: NEGATIVE
BUN SERPL-MCNC: 8 MG/DL (ref 7–30)
CALCIUM SERPL-MCNC: 8.6 MG/DL (ref 8.5–10.1)
CHLORIDE SERPL-SCNC: 105 MMOL/L (ref 94–109)
CO2 SERPL-SCNC: 28 MMOL/L (ref 20–32)
COLOR UR AUTO: YELLOW
CREAT SERPL-MCNC: 0.65 MG/DL (ref 0.52–1.04)
DIFFERENTIAL METHOD BLD: NORMAL
EOSINOPHIL # BLD AUTO: 0.1 10E9/L (ref 0–0.7)
EOSINOPHIL NFR BLD AUTO: 1.2 %
ERYTHROCYTE [DISTWIDTH] IN BLOOD BY AUTOMATED COUNT: 11.7 % (ref 10–15)
GFR SERPL CREATININE-BSD FRML MDRD: >90 ML/MIN/{1.73_M2}
GLUCOSE SERPL-MCNC: 98 MG/DL (ref 70–99)
GLUCOSE UR STRIP-MCNC: NEGATIVE MG/DL
HCG SERPL QL: NEGATIVE
HCT VFR BLD AUTO: 37.7 % (ref 35–47)
HGB BLD-MCNC: 12.4 G/DL (ref 11.7–15.7)
HGB UR QL STRIP: ABNORMAL
IMM GRANULOCYTES # BLD: 0 10E9/L (ref 0–0.4)
IMM GRANULOCYTES NFR BLD: 0.2 %
KETONES UR STRIP-MCNC: 5 MG/DL
LEUKOCYTE ESTERASE UR QL STRIP: ABNORMAL
LIPASE SERPL-CCNC: 50 U/L (ref 73–393)
LYMPHOCYTES # BLD AUTO: 1 10E9/L (ref 0.8–5.3)
LYMPHOCYTES NFR BLD AUTO: 17.5 %
MCH RBC QN AUTO: 30.8 PG (ref 26.5–33)
MCHC RBC AUTO-ENTMCNC: 32.9 G/DL (ref 31.5–36.5)
MCV RBC AUTO: 94 FL (ref 78–100)
MONOCYTES # BLD AUTO: 0.2 10E9/L (ref 0–1.3)
MONOCYTES NFR BLD AUTO: 3.7 %
MUCOUS THREADS #/AREA URNS LPF: PRESENT /LPF
NEUTROPHILS # BLD AUTO: 4.6 10E9/L (ref 1.6–8.3)
NEUTROPHILS NFR BLD AUTO: 77.2 %
NITRATE UR QL: NEGATIVE
NRBC # BLD AUTO: 0 10*3/UL
NRBC BLD AUTO-RTO: 0 /100
PH UR STRIP: 5.5 PH (ref 5–7)
PLATELET # BLD AUTO: 358 10E9/L (ref 150–450)
POTASSIUM SERPL-SCNC: 3.6 MMOL/L (ref 3.4–5.3)
PROT SERPL-MCNC: 8.3 G/DL (ref 6.8–8.8)
RBC # BLD AUTO: 4.03 10E12/L (ref 3.8–5.2)
RBC #/AREA URNS AUTO: 11 /HPF (ref 0–2)
SODIUM SERPL-SCNC: 140 MMOL/L (ref 133–144)
SOURCE: ABNORMAL
SP GR UR STRIP: 1.03 (ref 1–1.03)
SQUAMOUS #/AREA URNS AUTO: 33 /HPF (ref 0–1)
UROBILINOGEN UR STRIP-MCNC: NORMAL MG/DL (ref 0–2)
WBC # BLD AUTO: 5.9 10E9/L (ref 4–11)
WBC #/AREA URNS AUTO: 28 /HPF (ref 0–5)

## 2020-07-25 PROCEDURE — 96375 TX/PRO/DX INJ NEW DRUG ADDON: CPT | Performed by: EMERGENCY MEDICINE

## 2020-07-25 PROCEDURE — 25800030 ZZH RX IP 258 OP 636: Performed by: EMERGENCY MEDICINE

## 2020-07-25 PROCEDURE — 81001 URINALYSIS AUTO W/SCOPE: CPT | Performed by: EMERGENCY MEDICINE

## 2020-07-25 PROCEDURE — 85025 COMPLETE CBC W/AUTO DIFF WBC: CPT | Performed by: EMERGENCY MEDICINE

## 2020-07-25 PROCEDURE — 96374 THER/PROPH/DIAG INJ IV PUSH: CPT | Performed by: EMERGENCY MEDICINE

## 2020-07-25 PROCEDURE — 83690 ASSAY OF LIPASE: CPT | Performed by: EMERGENCY MEDICINE

## 2020-07-25 PROCEDURE — 74176 CT ABD & PELVIS W/O CONTRAST: CPT

## 2020-07-25 PROCEDURE — 96361 HYDRATE IV INFUSION ADD-ON: CPT | Performed by: EMERGENCY MEDICINE

## 2020-07-25 PROCEDURE — 80053 COMPREHEN METABOLIC PANEL: CPT | Performed by: EMERGENCY MEDICINE

## 2020-07-25 PROCEDURE — 87086 URINE CULTURE/COLONY COUNT: CPT | Performed by: EMERGENCY MEDICINE

## 2020-07-25 PROCEDURE — 25000128 H RX IP 250 OP 636: Performed by: EMERGENCY MEDICINE

## 2020-07-25 PROCEDURE — 84703 CHORIONIC GONADOTROPIN ASSAY: CPT | Performed by: EMERGENCY MEDICINE

## 2020-07-25 PROCEDURE — 99284 EMERGENCY DEPT VISIT MOD MDM: CPT | Mod: 25 | Performed by: EMERGENCY MEDICINE

## 2020-07-25 PROCEDURE — 99284 EMERGENCY DEPT VISIT MOD MDM: CPT | Mod: GC | Performed by: EMERGENCY MEDICINE

## 2020-07-25 RX ORDER — IOPAMIDOL 755 MG/ML
100 INJECTION, SOLUTION INTRAVASCULAR ONCE
Status: DISCONTINUED | OUTPATIENT
Start: 2020-07-25 | End: 2020-07-26 | Stop reason: HOSPADM

## 2020-07-25 RX ORDER — CEPHALEXIN 500 MG/1
500 CAPSULE ORAL 2 TIMES DAILY
Qty: 6 CAPSULE | Refills: 0 | Status: SHIPPED | OUTPATIENT
Start: 2020-07-25 | End: 2020-07-28

## 2020-07-25 RX ORDER — ONDANSETRON 2 MG/ML
4 INJECTION INTRAMUSCULAR; INTRAVENOUS EVERY 30 MIN PRN
Status: DISCONTINUED | OUTPATIENT
Start: 2020-07-25 | End: 2020-07-26 | Stop reason: HOSPADM

## 2020-07-25 RX ORDER — KETOROLAC TROMETHAMINE 15 MG/ML
15 INJECTION, SOLUTION INTRAMUSCULAR; INTRAVENOUS ONCE
Status: COMPLETED | OUTPATIENT
Start: 2020-07-25 | End: 2020-07-25

## 2020-07-25 RX ORDER — FAMOTIDINE 20 MG/1
20 TABLET, FILM COATED ORAL 2 TIMES DAILY PRN
Qty: 30 TABLET | Refills: 0 | Status: SHIPPED | OUTPATIENT
Start: 2020-07-25 | End: 2020-09-14

## 2020-07-25 RX ORDER — SODIUM CHLORIDE 9 MG/ML
INJECTION, SOLUTION INTRAVENOUS CONTINUOUS
Status: DISCONTINUED | OUTPATIENT
Start: 2020-07-25 | End: 2020-07-26 | Stop reason: HOSPADM

## 2020-07-25 RX ADMIN — KETOROLAC TROMETHAMINE 15 MG: 15 INJECTION, SOLUTION INTRAMUSCULAR; INTRAVENOUS at 21:37

## 2020-07-25 RX ADMIN — SODIUM CHLORIDE 1000 ML: 9 INJECTION, SOLUTION INTRAVENOUS at 21:32

## 2020-07-25 RX ADMIN — ONDANSETRON 4 MG: 2 INJECTION INTRAMUSCULAR; INTRAVENOUS at 21:34

## 2020-07-25 NOTE — ED AVS SNAPSHOT
[facility] [place], Emergency Department  [address]  [facility] [place] [address]  Phone:  [phone]  Fax:  [phone]                                    Sadi Bedoya   MRN: [id number]    Department:  [facility], Emergency Department   Date of Visit:  7/25/2020           After Visit Summary Signature Page    I have received my discharge instructions, and my questions have been answered. I have discussed any challenges I see with this plan with the nurse or doctor.    ..........................................................................................................................................  Patient/Patient Representative Signature      ..........................................................................................................................................  Patient Representative Print Name and Relationship to Patient    ..................................................               ................................................  Date                                   Time    ..........................................................................................................................................  Reviewed by Signature/Title    ...................................................              ..............................................  Date                                               Time          22EPIC Rev 08/18

## 2020-07-26 VITALS
OXYGEN SATURATION: 100 % | DIASTOLIC BLOOD PRESSURE: 57 MMHG | HEART RATE: 95 BPM | RESPIRATION RATE: 18 BRPM | TEMPERATURE: 97.9 F | SYSTOLIC BLOOD PRESSURE: 116 MMHG

## 2020-07-26 ASSESSMENT — ENCOUNTER SYMPTOMS
CHILLS: 0
DIARRHEA: 0
SORE THROAT: 0
COUGH: 0
VOMITING: 1
NAUSEA: 1
ARTHRALGIAS: 0
ABDOMINAL PAIN: 1
PALPITATIONS: 0
DYSURIA: 0
CONSTIPATION: 0
HEMATURIA: 0
LIGHT-HEADEDNESS: 0
MYALGIAS: 0
WOUND: 0
SHORTNESS OF BREATH: 0
HEADACHES: 0
FEVER: 0

## 2020-07-26 NOTE — RESULT ENCOUNTER NOTE
Emergency Dept/Urgent Care discharge antibiotic (if prescribed): Cephalexin (Keflex) 500 mg capsule, 1 capsule (500 mg) by mouth 2 times daily for 3 days.  Date of Rx (if applicable):  7/25/20  No changes in treatment per Urine culture protocol.

## 2020-07-26 NOTE — ED PROVIDER NOTES
ED Provider Note  [facility]      History     Chief Complaint   Patient presents with     Abdominal Pain     started 2 days ago. had some vomiting over past couple of days. Pt was fasting today, just broke fast now and threw up, last night when patient ate anything she would vomit.      HPI   Sadi Bedoya is a 45 year old   woman with a past medical history of cholecystectomy, R adnexal cyst, hx of H.pylori, schizoaffective disorder-bipolar type presenting with 2 days of bilateral upper abdominal pain and nonbloody vomiting.    Patient states she was in normal health until 2 days ago when she developed sharp b/l upper abdominal pain.   Patient states she has also had nausea and 2 episodes of nonbloody vomit. Has been fasting for most of the day, but was recently unable to tolerate food.  Had 1 nonbloody BM today.  Tylenol helps--last 7pm (1.5 hours ago)    Patient states her LMP began 2 days ago.    Denies fever, chills, urinary changes. States this pain is unlike anything she has had prior.    Past Medical History  History reviewed. No pertinent past medical history.  Past Surgical History:   Procedure Laterality Date     CHOLECYSTECTOMY       Acetaminophen (TYLENOL PO)  carbamide peroxide (DEBROX) 6.5 % otic solution  naproxen (NAPROSYN) 500 MG tablet  OLANZapine (ZYPREXA) 5 MG tablet  order for DME  order for DME  Prenatal Vit-Fe Fumarate-FA (PRENATAL MULTIVITAMIN PLUS IRON) 27-0.8 MG TABS per tablet  progesterone (PROMETRIUM) 100 MG capsule  risperiDONE (RISPERDAL) 2 MG tablet  vitamin D (ERGOCALCIFEROL) 68152 UNIT capsule      Allergies   Allergen Reactions     Alcohol [Ethanol]      Gelatin      No Known Drug Allergy      Past medical history, past surgical history, medications, and allergies were reviewed with the patient. Additional pertinent items: None    Family History  Family History   Problem Relation Age of Onset     Liver Disease Mother      Family history was  reviewed with the patient. Additional pertinent items: None    Social History  Social History     Tobacco Use     Smoking status: Never Smoker     Smokeless tobacco: Never Used   Substance Use Topics     Alcohol use: No     Drug use: No      Social history was reviewed with the patient. Additional pertinent items: None    Review of Systems   Constitutional: Negative for chills and fever.   HENT: Negative for congestion and sore throat.    Respiratory: Negative for cough and shortness of breath.    Cardiovascular: Negative for chest pain and palpitations.   Gastrointestinal: Positive for abdominal pain (upper>lower), nausea and vomiting. Negative for constipation and diarrhea.   Genitourinary: Negative for dysuria, hematuria, urgency, vaginal bleeding and vaginal discharge.   Musculoskeletal: Negative for arthralgias and myalgias.   Skin: Negative for rash and wound.   Neurological: Negative for light-headedness and headaches.   All other systems reviewed and are negative.    A complete review of systems was performed with pertinent positives and negatives noted in the HPI, and all other systems negative.    Physical Exam   BP: 116/79  Heart Rate: 106  Temp: 97.9  F (36.6  C)  Resp: 16  SpO2: 99 %  Physical Exam  Vitals signs and nursing note reviewed.   Constitutional:       General: She is not in acute distress.     Appearance: She is not ill-appearing, toxic-appearing or diaphoretic.   HENT:      Head: Atraumatic.      Mouth/Throat:      Pharynx: No oropharyngeal exudate.   Eyes:      General: No scleral icterus.     Pupils: Pupils are equal, round, and reactive to light.   Cardiovascular:      Rate and Rhythm: Normal rate and regular rhythm.      Heart sounds: Normal heart sounds. No murmur.   Pulmonary:      Effort: Pulmonary effort is normal. No respiratory distress.      Breath sounds: Normal breath sounds. No wheezing.   Abdominal:      General: Bowel sounds are normal. There is distension.      Palpations:  Abdomen is soft.      Tenderness: There is abdominal tenderness (diffuse, upper>lower). There is no guarding.   Musculoskeletal:         General: No tenderness.   Skin:     General: Skin is warm.      Findings: No rash.   Psychiatric:         Behavior: Behavior normal.         Thought Content: Thought content normal.         ED Course      Procedures        Results for orders placed or performed during the hospital encounter of 07/25/20   CT Abdomen Pelvis w/o Contrast     Status: None (Preliminary result)    Narrative    EXAM: CT ABDOMEN AND PELVIS WITHOUT CONTRAST  LOCATION: [facility]  DATE/TIME: 7/25/2020 10:12 PM    INDICATION: Abdominal pain.  COMPARISON: 12/07/2019.    TECHNIQUE: Note that an attempt to inject intravenous contrast was made twice, but the IV leaked and therefore the study was performed without intravenous contrast. CT scan of the abdomen and pelvis was performed without IV contrast. Multiplanar   reformats were obtained. Dose reduction techniques were used.  CONTRAST: None.    FINDINGS:    LOWER CHEST: Unremarkable.    HEPATOBILIARY: Prior cholecystectomy.    SPLEEN: Unremarkable.    PANCREAS: Unremarkable.    ADRENAL GLANDS: Unremarkable.    KIDNEYS/BLADDER: Small amount of excreted contrast material is present in the urinary tracts and bladder.    BOWEL: Small hiatal hernia. No dilatation of the small or large bowel. The appendix is not definitely visualized.    LYMPH NODES: Unremarkable.    MUSCULOSKELETAL: Bilateral L5 pars interarticularis defects.    OTHER: Large complex cystic mass containing thin internal septations in the right hemiabdomen and pelvis measuring up to 17 x 14 x 9 cm. On the comparison study, this measured 7 x 7 x 6 cm. A moderate to large amount of intraperitoneal free fluid, new.      Impression    IMPRESSION:   1. Large complex cystic mass in the right hemiabdomen and pelvis, significantly increased in size since 12/07/2019. This is nonspecific, but  could represent a cystic ovarian neoplasm.  2. A moderate to large amount of intraperitoneal free fluid, new.  3. No other acute abnormality identified in the abdomen or pelvis.    CBC with platelets differential     Status: None   Result Value Ref Range    WBC 5.9 4.0 - 11.0 10e9/L    RBC Count 4.03 3.8 - 5.2 10e12/L    Hemoglobin 12.4 11.7 - 15.7 g/dL    Hematocrit 37.7 35.0 - 47.0 %    MCV 94 78 - 100 fl    MCH 30.8 26.5 - 33.0 pg    MCHC 32.9 31.5 - 36.5 g/dL    RDW 11.7 10.0 - 15.0 %    Platelet Count 358 150 - 450 10e9/L    Diff Method Automated Method     % Neutrophils 77.2 %    % Lymphocytes 17.5 %    % Monocytes 3.7 %    % Eosinophils 1.2 %    % Basophils 0.2 %    % Immature Granulocytes 0.2 %    Nucleated RBCs 0 0 /100    Absolute Neutrophil 4.6 1.6 - 8.3 10e9/L    Absolute Lymphocytes 1.0 0.8 - 5.3 10e9/L    Absolute Monocytes 0.2 0.0 - 1.3 10e9/L    Absolute Eosinophils 0.1 0.0 - 0.7 10e9/L    Absolute Basophils 0.0 0.0 - 0.2 10e9/L    Abs Immature Granulocytes 0.0 0 - 0.4 10e9/L    Absolute Nucleated RBC 0.0    Comprehensive metabolic panel     Status: Abnormal   Result Value Ref Range    Sodium 140 133 - 144 mmol/L    Potassium 3.6 3.4 - 5.3 mmol/L    Chloride 105 94 - 109 mmol/L    Carbon Dioxide 28 20 - 32 mmol/L    Anion Gap 7 3 - 14 mmol/L    Glucose 98 70 - 99 mg/dL    Urea Nitrogen 8 7 - 30 mg/dL    Creatinine 0.65 0.52 - 1.04 mg/dL    GFR Estimate >90 >60 mL/min/[1.73_m2]    GFR Estimate If Black >90 >60 mL/min/[1.73_m2]    Calcium 8.6 8.5 - 10.1 mg/dL    Bilirubin Total 0.8 0.2 - 1.3 mg/dL    Albumin 3.2 (L) 3.4 - 5.0 g/dL    Protein Total 8.3 6.8 - 8.8 g/dL    Alkaline Phosphatase 80 40 - 150 U/L    ALT 19 0 - 50 U/L    AST 20 0 - 45 U/L   Lipase     Status: Abnormal   Result Value Ref Range    Lipase 50 (L) 73 - 393 U/L   HCG qualitative Blood     Status: None   Result Value Ref Range    HCG Qualitative Serum Negative NEG^Negative   UA reflex to Microscopic and Culture     Status: Abnormal     Specimen: Urine clean catch; Midstream Urine   Result Value Ref Range    Color Urine Yellow     Appearance Urine Slightly Cloudy     Glucose Urine Negative NEG^Negative mg/dL    Bilirubin Urine Negative NEG^Negative    Ketones Urine 5 (A) NEG^Negative mg/dL    Specific Gravity Urine 1.031 1.003 - 1.035    Blood Urine Trace (A) NEG^Negative    pH Urine 5.5 5.0 - 7.0 pH    Protein Albumin Urine 30 (A) NEG^Negative mg/dL    Urobilinogen mg/dL Normal 0.0 - 2.0 mg/dL    Nitrite Urine Negative NEG^Negative    Leukocyte Esterase Urine Large (A) NEG^Negative    Source Midstream Urine     RBC Urine 11 (H) 0 - 2 /HPF    WBC Urine 28 (H) 0 - 5 /HPF    Bacteria Urine Many (A) NEG^Negative /HPF    Squamous Epithelial /HPF Urine 33 (H) 0 - 1 /HPF    Mucous Urine Present (A) NEG^Negative /LPF   Urine Culture Aerobic Bacterial     Status: None (Preliminary result)    Specimen: Midstream Urine   Result Value Ref Range    Specimen Description Midstream Urine     Special Requests Specimen received in preservative     Culture Micro PENDING        No results found for any visits on 20.  Medications - No data to display     Assessments & Plan (with Medical Decision Making)     Sadi Bedoya is a 45 year old   woman with a past medical history of cholecystectomy, R adnexal cyst, hx of H.pylori, schizoaffective disorder-bipolar type presenting with 2 days of bilateral upper abdominal pain and nonbloody vomiting.    We are keeping a broad differential is broad at this point including gastrointestinal causes post-cholecystectomy biliary colic, gastritis/gastroenteritis, peptic ulcer disease, viral gastroenteritis, hepatitis, pancreatitis, and small bowel obstruction given prior cholecystectomy, though patient reports she has had a bowel movement today.   Gynecologic causes seem less likely at this point given the upper abdominal symptoms, but we will evaluate for potential ruptured ovarian cyst, IUP, ectopic pregnancy, and  ovarian torsion.    Patient is tachycardic to 106 on presentation, afebrile with normal blood pressure.     White count normal  Liver function tests normal  Lipase normal  CT scan showing large complex cystic mass in R juan m-abdomen and pelvis, significantly increased in size in last 8 months with moderate to large amount of intraperitoneal free fluid.    UA contaminated, but suspicious for possible UTI. Pepcid also given due to pt's upper abdominal pain and worsening of symptoms with fasting.     OB consulted, will evaluate in ED.   Pain control with tordol.    Patient appears stable for discharge with tylenol and NSAIDs for pain control per ED assessment with close follow-up with GYN for removal of adnexal mass and further evaluation (referral given). Patient handed off at 12:08 AM    I have reviewed the nursing notes. I have reviewed the findings, diagnosis, plan and need for follow up with the patient.    New Prescriptions    CEPHALEXIN (KEFLEX) 500 MG CAPSULE    Take 1 capsule (500 mg) by mouth 2 times daily for 3 days    FAMOTIDINE (PEPCID) 20 MG TABLET    Take 1 tablet (20 mg) by mouth 2 times daily as needed (abdominal pain)   Pt advised she may also take Tylenol or Ibuprofen as needed for pain at home    Final diagnoses:   Abdominal pain, generalized   Nausea and vomiting, intractability of vomiting not specified, unspecified vomiting type   Acute UTI   Ovarian mass, right       --  Narinder Culp MD  Family Medicine Resident--PGY 1  [facility]/ [facility] acute [facility], EMERGENCY DEPARTMENT  7/25/2020    ED Staff Attestation:    I have seen the patient with the resident and I agree with the documentation.  I have assessed the patient independently of the resident and the above note reflects our jointly agreed upon assessment and plan.         Evelina Wilson MD  07/26/20 0018

## 2020-07-26 NOTE — DISCHARGE INSTRUCTIONS
TODAY'S VISIT:  You were seen today for abdominal pain   -   - If you had any labs or imaging/radiology tests performed today, you should also discuss these tests with your usual provider.     FOLLOW-UP:  Please make an appointment to follow up with:  - Your Primary Care Provider. If you do not have a PCP, please call the Primary Care Center (phone: [phone] for an appointment  -   Please make an appointment to follow up with OB/Gyn - [facility] (phone: [phone]) as soon as possible      - Have your provider review the results from today's visit with you again to make sure no further follow-up or additional testing is needed based on those results.     RETURN TO THE EMERGENCY DEPARTMENT  Return to the Emergency Department at any time for any new or worsening symptoms or any concerns.

## 2020-07-26 NOTE — CONSULTS
Gynecology Consult Note    Consulting Physician: Evelina Wilson MD  Reason for Consult: Pelvic mass    HPI:   Sadi Bedoya is a 45 year old  presenting to the ED with diffuse abdominal pain. Pain onset 2 days ago. She has had similar episodes over the past 2 months. Pain is sharp in nature, worse in epigastric region. Severity is 6/10. Tylenol has not provided significant relief. Reports 2 episodes of emesis earlier today. Decreased appetite this evening but no nausea. Having regular BMs, last yesterday. No urinary symptoms. Denies abnormal vaginal discharge or bleeding.    OBHx:   OB History    Para Term  AB Living   4 3 3 0 1 3   SAB TAB Ectopic Multiple Live Births   1 0 0 0 3      # Outcome Date GA Lbr Brett/2nd Weight Sex Delivery Anes PTL Lv   4 Term  40w0d  3.629 kg (8 lb) F    ALEA   3 SAB               Birth Comments: medical management   2 Term  40w0d   M    ALEA   1 Term  40w0d   M    ALEA     GynHx:   - LMP: ended 2 days ago.   - Menses: Irregular every 1-2 months  - Pap smear history: reports receiving regular paps with no history of abnormal  - Sexually activity: [place] not been sexually active in some time  - History of STDS/UTIs: denies    Past medical history:   Negative    Past surgical history:   Past Surgical History:   Procedure Laterality Date     CHOLECYSTECTOMY       Medications:  Current Facility-Administered Medications   Medication     iopamidol (ISOVUE-370) solution 100 mL     ondansetron (ZOFRAN) injection 4 mg     sodium chloride 0.9 % bag 500mL for CT scan flush use     sodium chloride 0.9% infusion     Current Outpatient Medications   Medication     cephALEXin (KEFLEX) 500 MG capsule     famotidine (PEPCID) 20 MG tablet     Acetaminophen (TYLENOL PO)     carbamide peroxide (DEBROX) 6.5 % otic solution     naproxen (NAPROSYN) 500 MG tablet     OLANZapine (ZYPREXA) 5 MG tablet     order for DME      order for DME     Prenatal Vit-Fe Fumarate-FA (PRENATAL MULTIVITAMIN PLUS IRON) 27-0.8 MG TABS per tablet     progesterone (PROMETRIUM) 100 MG capsule     risperiDONE (RISPERDAL) 2 MG tablet     vitamin D (ERGOCALCIFEROL) 91356 UNIT capsule       Allergies:  Allergies   Allergen Reactions     Alcohol [Ethanol]      Gelatin      No Known Drug Allergy        Social Hx:   Social History     Tobacco Use     Smoking status: Never Smoker     Smokeless tobacco: Never Used   Substance Use Topics     Alcohol use: No     Drug use: No        Family History:   family history includes Liver Disease in her mother.   No family history of breast, ovarian or uterine cancer. No history of DVT or migranes.    ROS:   Negative except per HPI    Objective:   /79   Temp 97.9  F (36.6  C) (Oral)   Resp 16   SpO2 99%   Constitutional: Comfortable appearing female, lying in bed  HEENT: Normal appearance.    Cardiovascular: Well perfused.  Respiratory: Non-labored breathing.  Gastrointestinal: Abdomen obese, soft. Limited by habitus but no distinctly palpable mass. Mild tenderness throughout, worse in epigastric region and bilateral lower quadrants.  Pelvic Exam - : Deferred    Labs/Imaging:  Results for orders placed or performed during the hospital encounter of 07/25/20   CT Abdomen Pelvis w/o Contrast     Status: None    Narrative    EXAM: CT ABDOMEN AND PELVIS WITHOUT CONTRAST  LOCATION: [facility]  DATE/TIME: 7/25/2020 10:12 PM    INDICATION: Abdominal pain.  COMPARISON: 12/07/2019.    TECHNIQUE: Note that an attempt to inject intravenous contrast was made twice, but the IV leaked and therefore the study was performed without intravenous contrast. CT scan of the abdomen and pelvis was performed without IV contrast. Multiplanar   reformats were obtained. Dose reduction techniques were used.  CONTRAST: None.    FINDINGS:    LOWER CHEST: Unremarkable.    HEPATOBILIARY: Prior cholecystectomy.    SPLEEN:  Unremarkable.    PANCREAS: Unremarkable.    ADRENAL GLANDS: Unremarkable.    KIDNEYS/BLADDER: A small amount of excreted contrast material is present in the urinary tracts and bladder.    BOWEL: Small hiatal hernia. No dilatation of the small or large bowel. The appendix is not definitely visualized.    LYMPH NODES: Unremarkable.    MUSCULOSKELETAL: Bilateral L5 pars interarticularis defects.    OTHER: Large complex cystic mass containing thin internal septations in the right hemiabdomen and pelvis measuring up to 17 x 14 x 9 cm. On the comparison study, this measured 7 x 7 x 6 cm. A moderate to large amount of intraperitoneal free fluid, new.      Impression    IMPRESSION:   1. Large complex cystic mass in the right hemiabdomen and pelvis, significantly increased in size since 12/07/2019. This is nonspecific, but most likely represents a cystic ovarian neoplasm.  2. A moderate to large amount of intraperitoneal free fluid, new.  3. No other acute abnormality identified in the abdomen or pelvis.    CBC with platelets differential     Status: None   Result Value Ref Range    WBC 5.9 4.0 - 11.0 10e9/L    RBC Count 4.03 3.8 - 5.2 10e12/L    Hemoglobin 12.4 11.7 - 15.7 g/dL    Hematocrit 37.7 35.0 - 47.0 %    MCV 94 78 - 100 fl    MCH 30.8 26.5 - 33.0 pg    MCHC 32.9 31.5 - 36.5 g/dL    RDW 11.7 10.0 - 15.0 %    Platelet Count 358 150 - 450 10e9/L    Diff Method Automated Method     % Neutrophils 77.2 %    % Lymphocytes 17.5 %    % Monocytes 3.7 %    % Eosinophils 1.2 %    % Basophils 0.2 %    % Immature Granulocytes 0.2 %    Nucleated RBCs 0 0 /100    Absolute Neutrophil 4.6 1.6 - 8.3 10e9/L    Absolute Lymphocytes 1.0 0.8 - 5.3 10e9/L    Absolute Monocytes 0.2 0.0 - 1.3 10e9/L    Absolute Eosinophils 0.1 0.0 - 0.7 10e9/L    Absolute Basophils 0.0 0.0 - 0.2 10e9/L    Abs Immature Granulocytes 0.0 0 - 0.4 10e9/L    Absolute Nucleated RBC 0.0    Comprehensive metabolic panel     Status: Abnormal   Result Value Ref Range     Sodium 140 133 - 144 mmol/L    Potassium 3.6 3.4 - 5.3 mmol/L    Chloride 105 94 - 109 mmol/L    Carbon Dioxide 28 20 - 32 mmol/L    Anion Gap 7 3 - 14 mmol/L    Glucose 98 70 - 99 mg/dL    Urea Nitrogen 8 7 - 30 mg/dL    Creatinine 0.65 0.52 - 1.04 mg/dL    GFR Estimate >90 >60 mL/min/[1.73_m2]    GFR Estimate If Black >90 >60 mL/min/[1.73_m2]    Calcium 8.6 8.5 - 10.1 mg/dL    Bilirubin Total 0.8 0.2 - 1.3 mg/dL    Albumin 3.2 (L) 3.4 - 5.0 g/dL    Protein Total 8.3 6.8 - 8.8 g/dL    Alkaline Phosphatase 80 40 - 150 U/L    ALT 19 0 - 50 U/L    AST 20 0 - 45 U/L   Lipase     Status: Abnormal   Result Value Ref Range    Lipase 50 (L) 73 - 393 U/L   HCG qualitative Blood     Status: None   Result Value Ref Range    HCG Qualitative Serum Negative NEG^Negative   UA reflex to Microscopic and Culture     Status: Abnormal    Specimen: Urine clean catch; Midstream Urine   Result Value Ref Range    Color Urine Yellow     Appearance Urine Slightly Cloudy     Glucose Urine Negative NEG^Negative mg/dL    Bilirubin Urine Negative NEG^Negative    Ketones Urine 5 (A) NEG^Negative mg/dL    Specific Gravity Urine 1.031 1.003 - 1.035    Blood Urine Trace (A) NEG^Negative    pH Urine 5.5 5.0 - 7.0 pH    Protein Albumin Urine 30 (A) NEG^Negative mg/dL    Urobilinogen mg/dL Normal 0.0 - 2.0 mg/dL    Nitrite Urine Negative NEG^Negative    Leukocyte Esterase Urine Large (A) NEG^Negative    Source Midstream Urine     RBC Urine 11 (H) 0 - 2 /HPF    WBC Urine 28 (H) 0 - 5 /HPF    Bacteria Urine Many (A) NEG^Negative /HPF    Squamous Epithelial /HPF Urine 33 (H) 0 - 1 /HPF    Mucous Urine Present (A) NEG^Negative /LPF   Urine Culture Aerobic Bacterial     Status: None (Preliminary result)    Specimen: Midstream Urine   Result Value Ref Range    Specimen Description Midstream Urine     Special Requests Specimen received in preservative     Culture Micro PENDING         Assessment/Plan:   Sadi Bedoya is a 45 year old  with a  known history of right adnexa mass, previously 6.5 cm in 12/2019. Plan was made for reimaging, but she was lost to follow up. She presents today with generalized abdominal pain x2 days with some nausea/vomiting. Abdominal exam with only mild tenderness, no evidence of surgical abdomen. Patient is afebrile with normal vital signs. Workup remarkable for 17 cm complex cystic mass in R hemiabdomen and pelvis. UA is contaminated but otherwise normal lab workup.    Patient does not have acute abdomen or indication for emergent surgery. Given size and complex nature of mass, would recommend referral to gynecologic oncology and evaluation in clinic ASAP to discuss surgical intervention. We reviewed that mass may very well be benign but cannot rule out malignancy. Patient declines referral to [facility] stating she wishes to go to [place], as her family is near there. Informed the patient that I could not provide a direct referral or facilitate clinic follow up with that system but that it is her choice to seek care wherever she wishes. She requests discharge paperwork stating she has a mass and needs referral. This was discussed with her ED provider.     Will provide patient with our clinic phone number. Encouraged to call if she is unable to be evaluated at [place] or changes her mind and would like to seek care at [facility].    Discussed with Dr. Tone Michael MD  OB/GYN Resident, PGY-3  7/26/2020 1:13 AM

## 2020-07-27 LAB
BACTERIA SPEC CULT: ABNORMAL
BACTERIA SPEC CULT: ABNORMAL
Lab: ABNORMAL
SPECIMEN SOURCE: ABNORMAL

## 2020-08-07 ENCOUNTER — APPOINTMENT (OUTPATIENT)
Dept: INTERPRETER SERVICES | Facility: CLINIC | Age: 45
End: 2020-08-07
Payer: COMMERCIAL

## 2020-08-07 ENCOUNTER — TRANSCRIBE ORDERS (OUTPATIENT)
Dept: OTHER | Age: 45
End: 2020-08-07

## 2020-08-07 DIAGNOSIS — N94.89 ADNEXAL MASS: Primary | ICD-10-CM

## 2020-08-12 ENCOUNTER — TELEPHONE (OUTPATIENT)
Dept: ONCOLOGY | Facility: CLINIC | Age: 45
End: 2020-08-12

## 2020-08-12 NOTE — LETTER
"    August 12, 2020       TO: Sadi Bedoya  [address]  [place] [address]         Dear Ms. Bedoya,    Our records indicate that you have not scheduled an appointment for a consult, as recommended by [facility]. If you wish to schedule within [facility], we have several options to help you schedule your appointment:      Call [phone]    Visit our website at [email or web address] and click \"I Want To\" (in upper right) and select Request an Appointment    Request an appointment via [employer] at [email or web address]     If you have chosen to schedule elsewhere or if you have already made an appointment, please disregard this letter.    If you have any questions or concerns regarding the information above, please contact the [facility] at [phone].      Sincerely,      [facility]                  "

## 2020-08-12 NOTE — TELEPHONE ENCOUNTER
Oncology/Surgical Oncology Referral Request:     Specialty Requested: Medical Oncology     Referring Provider: Unknown    Referring Clinic/Organization: [facility]    Records location: No records received     Requested Provider (if specified): Not Specified      Tried to reach pt 2 times, sending letter

## 2020-08-13 ENCOUNTER — APPOINTMENT (OUTPATIENT)
Dept: INTERPRETER SERVICES | Facility: CLINIC | Age: 45
End: 2020-08-13
Payer: COMMERCIAL

## 2020-08-13 NOTE — TELEPHONE ENCOUNTER
ONCOLOGY INTAKE: Records Information      APPT INFORMATION:  Referring provider: N/a  Referring provider s clinic:  [facility]  Reason for visit/diagnosis: Adnexal mass   Has patient been notified of appointment date and time?: Yes    RECORDS INFORMATION:  Were the records received with the referral (via [employer])? No    Has patient been seen for any external appt for this diagnosis? Yes    If yes, where? [facility]    Has patient had any imaging or procedures outside of [place] for this condition? Yes      If Yes, where? [facility]    ADDITIONAL INFORMATION:  None

## 2020-08-14 NOTE — TELEPHONE ENCOUNTER
Action    Action Taken 8/20/20: Imaging from [facility] resolved, and now viewable to PACS.  11:14 AM         RECORDS STATUS - ALL OTHER DIAGNOSIS      RECORDS RECEIVED FROM: [facility], [facility]   DATE RECEIVED:    NOTES STATUS DETAILS   OFFICE NOTE from referring provider SHANTA - [facility] 8/6/20 ED visit   OFFICE NOTE from medical oncologist     DISCHARGE SUMMARY from hospital     DISCHARGE REPORT from the ER [facility]/ 8/6/20, 3/10/20: [facility]    7/25/20, 12/7/19: [facility]   OPERATIVE REPORT     MEDICATION LIST [facility]/    CLINICAL TRIAL TREATMENTS TO DATE     LABS     PATHOLOGY REPORTS     ANYTHING RELATED TO DIAGNOSIS Epic/ 8/6/20   GENONOMIC TESTING     TYPE:     IMAGING (NEED IMAGES & REPORT)     CT SCANS PACS 8/7/20: [facility], Report in [facility]    PACS: 7/25/20, 12/8/19   MRI     MAMMO     ULTRASOUND PACS 3/10/20, 3/3/20: [facility], Report in [facility]    PACS: 12/8/19   PET

## 2020-08-25 ENCOUNTER — ONCOLOGY VISIT (OUTPATIENT)
Dept: ONCOLOGY | Facility: CLINIC | Age: 45
End: 2020-08-25
Attending: OBSTETRICS & GYNECOLOGY
Payer: COMMERCIAL

## 2020-08-25 ENCOUNTER — PRE VISIT (OUTPATIENT)
Dept: ONCOLOGY | Facility: CLINIC | Age: 45
End: 2020-08-25

## 2020-08-25 VITALS
HEART RATE: 87 BPM | DIASTOLIC BLOOD PRESSURE: 65 MMHG | OXYGEN SATURATION: 100 % | TEMPERATURE: 98.1 F | WEIGHT: 231 LBS | BODY MASS INDEX: 39.68 KG/M2 | SYSTOLIC BLOOD PRESSURE: 92 MMHG

## 2020-08-25 DIAGNOSIS — N94.89 ADNEXAL MASS: ICD-10-CM

## 2020-08-25 PROCEDURE — 99205 OFFICE O/P NEW HI 60 MIN: CPT | Mod: ZP | Performed by: OBSTETRICS & GYNECOLOGY

## 2020-08-25 PROCEDURE — G0463 HOSPITAL OUTPT CLINIC VISIT: HCPCS | Mod: ZF

## 2020-08-25 ASSESSMENT — PAIN SCALES - GENERAL: PAINLEVEL: NO PAIN (0)

## 2020-08-25 NOTE — NURSING NOTE
"Oncology Rooming Note    August 25, 2020 11:49 AM   Sadi Bedoya is a 45 year old female who presents for:    Chief Complaint   Patient presents with     New Patient     AEH Adnexal mass.     Initial Vitals: BP 92/65   Pulse 87   Temp 98.1  F (36.7  C)   Wt 104.8 kg (231 lb)   SpO2 100%   BMI 39.68 kg/m   Estimated body mass index is 39.68 kg/m  as calculated from the following:    Height as of 1/13/20: 1.625 m (5' 3.98\").    Weight as of this encounter: 104.8 kg (231 lb). Body surface area is 2.18 meters squared.  No Pain (0) Comment: Data Unavailable   No LMP recorded. (Menstrual status: Irregular Periods).  Allergies reviewed: Yes  Medications reviewed: Yes    Medications: MEDICATION REFILLS NEEDED TODAY. Provider was notified.  Pharmacy name entered into [employer]:    [id number] - [place], [place] - [address] AT [facility] [address]  [employer]    Clinical concerns: New Patient today Dr. Solis was NOT notified.      Jarett Mazariegos MA            "

## 2020-08-25 NOTE — PATIENT INSTRUCTIONS
Plan for surgery:  Xlap, removal of pelvic mass, unilateral salpingo-oophorectomy, possible cancer staging with hysterectomy, removal of both ovaries, biopsies, lymph nodes    You will be contacted with surgical date    Aisha Solis MD  Gynecologic Oncology  [facility]

## 2020-08-25 NOTE — LETTER
"    2020         RE: Sadi Bedoya  [id number]  [place] [facility] [id number]        Dear Colleague,    Thank you for referring your patient, Sadi Bedoya, to the [facility]. Please see a copy of my visit note below.      Consult Notes on Referred Patient    Date: 2020       Dr. Alvaro Gomez MD  XX [place] [address]       RE: Sadi Bedoya  : 1975  MAGDIEL: 2020    Dear Dr. Alvaro Freitas*:    I had the pleasure of seeing your patient Sadi Bedoya here at the Gynecologic Cancer Clinic at the [facility] on 2020.  As you know she is a very pleasant 34 year old woman (per chart age is 45, however stated age is 34) with a recent diagnosis of cystic ovarian mass.  Given these findings she was subsequently sent to the Gynecologic Cancer Clinic for new patient consultation.     Initial ED presentation in December for abdominal pain, was told she had a cyst. Symptoms resolved, no surgical interventions due to desire for pregnancy. Was lost to interval followup. One month ago, ED presentation for worse abdominal pain and \"swelling\", CT imaging as below. Since ED visit, doing okay. Takes tylenol to manage intermittent pain. Swelling improved. Denies fevers/chills, nausea/vomiting, bladder/bowel issues, vaginal bleeding. Normal energy level and appetite. Desires surgery.     , children ages 13, 11, 8  Irregular periods, still desires pregnancy    CT AP 2019  1.  7.4 cm complex right adnexal cyst. Small amount of free pelvic fluid.  2.  No bowel obstruction or inflammation.    CT AP 2020  1. Large complex cystic mass measuring up to 17 x 14 x 9 cm in the right hemiabdomen and pelvis, significantly increased in size since 2019. This is nonspecific, but most likely represents a cystic ovarian neoplasm.  2. A moderate to large amount of intraperitoneal free fluid, new.  3. No " other acute abnormality identified in the abdomen or pelvis.      Review of Systems:    Systemic           no weight changes; no fever; no chills; no night sweats; no appetite changes  Skin           no rashes, or lesions  Eye           no irritation; no changes in vision  Aracelis-Laryngeal           no dysphagia; no hoarseness   Pulmonary    no cough; no shortness of breath  Cardiovascular    no chest pain; no palpitations  Gastrointestinal    no diarrhea; no constipation; no abdominal pain; no changes in bowel  habits; no blood in stool  Genitourinary   no urinary frequency; no urinary urgency; no dysuria; no pain; no abnormal vaginal discharge; no abnormal vaginal bleeding  Breast   no breast discharge; no breast changes; no breast pain  Musculoskeletal    no myalgias; no arthralgias; no back pain  Psychiatric           no depressed mood; no anxiety    Hematologic           no tender lymph nodes; no noticeable swellings or lumps   Endocrine    no hot flashes; no heat/cold intolerance         Neurological   no tremor; no numbness and tingling; no headaches; no difficulty  sleeping      Past Medical History:    Schizoaffective disorder, bipolar type    Past Surgical History:    Past Surgical History:   Procedure Laterality Date     CHOLECYSTECTOMY           Health Maintenance:  Health Maintenance Due   Topic Date Due     HIV SCREENING  1991     DTAP/TDAP/TD IMMUNIZATION (3 - Td) 2009     PREVENTIVE CARE VISIT  10/24/2018     PHQ-2  2020     INFLUENZA VACCINE (1) 2020       Last Pap Smear:               Result: normal  She has not had a history of abnormal Pap smears.    Last Mammogram: none    Last Colonoscopy: none                    Last DEXA Scan: none    Last Diabetes Screenin.2 in 3/2019    Last Thyroid Screening:      3.86 in 3/2019    Last Cholest Screening:      Normal 3/2019      Current Medications:     has a current medication list which includes the following prescription(s):  acetaminophen, famotidine, olanzapine, order for dme, order for dme, prenatal multivitamin w/iron, risperidone, vitamin d2, carbamide peroxide, naproxen, and progesterone.       Allergies:        Allergies   Allergen Reactions     Alcohol [Ethanol]      Gelatin      No Known Drug Allergy            Social History:     Social History     Tobacco Use     Smoking status: Never Smoker     Smokeless tobacco: Never Used   Substance Use Topics     Alcohol use: No   Does not work outside the home  Lives with her  and three children  Presents to clinic with her niece    History   Drug Use No           Family History:     The patient's family history is notable for no history of cancer.    Family History   Problem Relation Age of Onset     Liver Disease Mother          Physical Exam:     BP 92/65   Pulse 87   Temp 98.1  F (36.7  C)   Wt 104.8 kg (231 lb)   SpO2 100%   BMI 39.68 kg/m    Body mass index is 39.68 kg/m .    General Appearance: healthy and alert, no distress     HEENT:  no thyromegaly, no palpable nodules or masses        Cardiovascular: regular rate and rhythm     Respiratory: nonlabored breathing on room air    Musculoskeletal: extremities non tender and without edema    Skin: no lesions or rashes     Neurological: normal gait, no gross defects     Psychiatric: appropriate mood and affect                              Gastrointestinal:       abdomen soft, non-tender, distended, large left abdominal mass, mobile    Genitourinary: deferred      Assessment:    Sadi Bedoya is a 45 year old woman with a new diagnosis of cystic ovarian mass.     A total of 60 minutes was spent with the patient, 50 minutes of which were spent in counseling the patient and/or treatment planning.    Due to language barrier, an  was present during the history-taking and subsequent discussion (and for part of the physical exam) with this patient.      Plan:     1.) Imaging and exam findings reviewed in detail  with patient.  DDx including benign and malignant process discussed.  Recommend surgery for diagnosis and treatment. Recommend: Exploratory laparotomy, removal of pelvic mass, unilateral salpingo-oophorectomy, possible cancer staging with hysterectomy, bilateral salpingo-oophorectomy, biopsies, lymph nodes    Risks, benefits and alternatives to proceed discussed in detail with the patient. Risks include but are not limited to bleeding, infection, possible injury to surrounding organs including bowel, bladder, ureter, need for second procedure/surgery related to complications from first procedure, postoperative medical complications such as cardiopulmonary events, lymphedema, lymphocyst, thromboembolic events. Also discussed specific risks related to the procedure including need for additional surgery or treatment based on final pathology.  If at all possible, patient would like fertility preservation and understands need for additional surgery in future if indicated.Will arrange appropriate preoperative blood work, PAC. Patient also advised on need for postoperative surveillance and/or adjuvant therapy. Questions answered, patient expressed understanding of plan of care.       2.) Genetic risk factors were assessed and the patient does not meet the qualifications for a referral.      3.) Labs and/or tests ordered include:  , CEA, CA 19-9      4.) Health maintenance issues addressed today include none.    5.) Pre-op teaching was completed today.  Risks of surgery were discussed to include: bleeding, transfusion, infection, unintentional injury to surrounding organs/structures.      Thank you for allowing us to participate in the care of your patient.         Sincerely,    Aisha Solis MD  Gynecologic Oncology  [facility]        ROBERT JOYCE      Orders placed for OR on 9/19/20    Procedure:  Exploratory laparotomy, removal of pelvic mass, unilateral  salpingo-oophorectomy, possible cancer staging with hysterectomy, bilateral salpingo-oophorectomy, biopsies, lymph nodes      Indication: Pelvic mass    Needs preop labs, COVID, PAC, consent day of surgery    Aisha Solis MD  Gynecologic Oncology  [facility]      Again, thank you for allowing me to participate in the care of your patient.        Sincerely,        Aisha Solis MD

## 2020-08-25 NOTE — PROGRESS NOTES
"  Consult Notes on Referred Patient    Date: 2020       Dr. Alvaro Gomez MD  [place] [address]       RE: Sadi Bedoya  : 1975  MAGDIEL: 2020    Dear Dr. Alvaro Freitas*:    I had the pleasure of seeing your patient Sadi Bedoya here at the Gynecologic Cancer Clinic at the [facility] on 2020.  As you know she is a very pleasant 34 year old woman (per chart age is 45, however stated age is 34) with a recent diagnosis of cystic ovarian mass.  Given these findings she was subsequently sent to the Gynecologic Cancer Clinic for new patient consultation.     Initial ED presentation in December for abdominal pain, was told she had a cyst. Symptoms resolved, no surgical interventions due to desire for pregnancy. Was lost to interval followup. One month ago, ED presentation for worse abdominal pain and \"swelling\", CT imaging as below. Since ED visit, doing okay. Takes tylenol to manage intermittent pain. Swelling improved. Denies fevers/chills, nausea/vomiting, bladder/bowel issues, vaginal bleeding. Normal energy level and appetite. Desires surgery.     , children ages 13, 11, 8  Irregular periods, still desires pregnancy    CT AP 2019  1.  7.4 cm complex right adnexal cyst. Small amount of free pelvic fluid.  2.  No bowel obstruction or inflammation.    CT AP 2020  1. Large complex cystic mass measuring up to 17 x 14 x 9 cm in the right hemiabdomen and pelvis, significantly increased in size since 2019. This is nonspecific, but most likely represents a cystic ovarian neoplasm.  2. A moderate to large amount of intraperitoneal free fluid, new.  3. No other acute abnormality identified in the abdomen or pelvis.      Review of Systems:    Systemic           no weight changes; no fever; no chills; no night sweats; no appetite changes  Skin           no rashes, or lesions  Eye           no irritation; no changes in " vision  Aracelis-Laryngeal           no dysphagia; no hoarseness   Pulmonary    no cough; no shortness of breath  Cardiovascular    no chest pain; no palpitations  Gastrointestinal    no diarrhea; no constipation; no abdominal pain; no changes in bowel  habits; no blood in stool  Genitourinary   no urinary frequency; no urinary urgency; no dysuria; no pain; no abnormal vaginal discharge; no abnormal vaginal bleeding  Breast   no breast discharge; no breast changes; no breast pain  Musculoskeletal    no myalgias; no arthralgias; no back pain  Psychiatric           no depressed mood; no anxiety    Hematologic           no tender lymph nodes; no noticeable swellings or lumps   Endocrine    no hot flashes; no heat/cold intolerance         Neurological   no tremor; no numbness and tingling; no headaches; no difficulty  sleeping      Past Medical History:    Schizoaffective disorder, bipolar type    Past Surgical History:    Past Surgical History:   Procedure Laterality Date     CHOLECYSTECTOMY           Health Maintenance:  Health Maintenance Due   Topic Date Due     HIV SCREENING  1991     DTAP/TDAP/TD IMMUNIZATION (3 - Td) 2009     PREVENTIVE CARE VISIT  10/24/2018     PHQ-2  2020     INFLUENZA VACCINE (1) 2020       Last Pap Smear:               Result: normal  She has not had a history of abnormal Pap smears.    Last Mammogram: none    Last Colonoscopy: none                    Last DEXA Scan: none    Last Diabetes Screenin.2 in 3/2019    Last Thyroid Screening:      3.86 in 3/2019    Last Cholest Screening:      Normal 3/2019      Current Medications:     has a current medication list which includes the following prescription(s): acetaminophen, famotidine, olanzapine, order for dme, order for dme, prenatal multivitamin w/iron, risperidone, vitamin d2, carbamide peroxide, naproxen, and progesterone.       Allergies:        Allergies   Allergen Reactions     Alcohol [Ethanol]      Gelatin       No Known Drug Allergy            Social History:     Social History     Tobacco Use     Smoking status: Never Smoker     Smokeless tobacco: Never Used   Substance Use Topics     Alcohol use: No   Does not work outside the home  Lives with her  and three children  Presents to clinic with her niece    History   Drug Use No           Family History:     The patient's family history is notable for no history of cancer.    Family History   Problem Relation Age of Onset     Liver Disease Mother          Physical Exam:     BP 92/65   Pulse 87   Temp 98.1  F (36.7  C)   Wt 104.8 kg (231 lb)   SpO2 100%   BMI 39.68 kg/m    Body mass index is 39.68 kg/m .    General Appearance: healthy and alert, no distress     HEENT:  no thyromegaly, no palpable nodules or masses        Cardiovascular: regular rate and rhythm     Respiratory: nonlabored breathing on room air    Musculoskeletal: extremities non tender and without edema    Skin: no lesions or rashes     Neurological: normal gait, no gross defects     Psychiatric: appropriate mood and affect                              Gastrointestinal:       abdomen soft, non-tender, distended, large left abdominal mass, mobile    Genitourinary: deferred      Assessment:    Sadi Bedoya is a 45 year old woman with a new diagnosis of cystic ovarian mass.     A total of 60 minutes was spent with the patient, 50 minutes of which were spent in counseling the patient and/or treatment planning.    Due to language barrier, an  was present during the history-taking and subsequent discussion (and for part of the physical exam) with this patient.      Plan:     1.) Imaging and exam findings reviewed in detail with patient.  DDx including benign and malignant process discussed.  Recommend surgery for diagnosis and treatment. Recommend: Exploratory laparotomy, removal of pelvic mass, unilateral salpingo-oophorectomy, possible cancer staging with hysterectomy, bilateral  salpingo-oophorectomy, biopsies, lymph nodes    Risks, benefits and alternatives to proceed discussed in detail with the patient. Risks include but are not limited to bleeding, infection, possible injury to surrounding organs including bowel, bladder, ureter, need for second procedure/surgery related to complications from first procedure, postoperative medical complications such as cardiopulmonary events, lymphedema, lymphocyst, thromboembolic events. Also discussed specific risks related to the procedure including need for additional surgery or treatment based on final pathology.  If at all possible, patient would like fertility preservation and understands need for additional surgery in future if indicated.Will arrange appropriate preoperative blood work, PAC. Patient also advised on need for postoperative surveillance and/or adjuvant therapy. Questions answered, patient expressed understanding of plan of care.       2.) Genetic risk factors were assessed and the patient does not meet the qualifications for a referral.      3.) Labs and/or tests ordered include:  , CEA, CA 19-9      4.) Health maintenance issues addressed today include none.    5.) Pre-op teaching was completed today.  Risks of surgery were discussed to include: bleeding, transfusion, infection, unintentional injury to surrounding organs/structures.      Thank you for allowing us to participate in the care of your patient.         Sincerely,    Aisha Solis MD  Gynecologic Oncology  [facility]        ROBERT JOYCE

## 2020-08-27 ENCOUNTER — TELEPHONE (OUTPATIENT)
Dept: ONCOLOGY | Facility: CLINIC | Age: 45
End: 2020-08-27

## 2020-08-27 DIAGNOSIS — Z11.59 ENCOUNTER FOR SCREENING FOR OTHER VIRAL DISEASES: Primary | ICD-10-CM

## 2020-08-27 PROBLEM — N94.89 ADNEXAL MASS: Status: ACTIVE | Noted: 2020-08-27

## 2020-08-27 RX ORDER — PHENAZOPYRIDINE HYDROCHLORIDE 200 MG/1
200 TABLET, FILM COATED ORAL ONCE
Status: CANCELLED | OUTPATIENT
Start: 2020-08-27 | End: 2020-08-27

## 2020-08-27 RX ORDER — CEFAZOLIN SODIUM 1 G/50ML
1 INJECTION, SOLUTION INTRAVENOUS SEE ADMIN INSTRUCTIONS
Status: CANCELLED | OUTPATIENT
Start: 2020-08-27

## 2020-08-27 RX ORDER — CEFAZOLIN SODIUM 2 G/50ML
2 SOLUTION INTRAVENOUS
Status: CANCELLED | OUTPATIENT
Start: 2020-08-27

## 2020-08-27 NOTE — PROGRESS NOTES
Orders placed for OR on 9/19/20    Procedure:  Exploratory laparotomy, removal of pelvic mass, unilateral salpingo-oophorectomy, possible cancer staging with hysterectomy, bilateral salpingo-oophorectomy, biopsies, lymph nodes      Indication: Pelvic mass    Needs preop labs, COVID, PAC, consent day of surgery    Aisha Solis MD  Gynecologic Oncology  [facility]

## 2020-08-27 NOTE — TELEPHONE ENCOUNTER
Called with a [facility] .    Surgery is scheduled with Dr. Solis on 9/16 at [place].  Scheduled per surgeon.    H&P: to be completed by PAC.  PAC: 9/14  COVID-19 test: 9/14 at the [facility] with labs after  Post-op appointment: 10/13, as a in person    The RN completed the education regarding the surgery. The surgery packet was provided that contains surgical instructions.    Pt is aware that they will need to complete the COVID-19 test that was scheduled within 4 days of their surgery.    They are aware that they will get their finalized times at their appointment with PAC.    I contacted the patient and left a voicemail to confirm the scheduled dates.

## 2020-08-28 NOTE — TELEPHONE ENCOUNTER
FUTURE VISIT INFORMATION      SURGERY INFORMATION:    Date: 20    Location: uu or    Surgeon:  Aisha Solis MD     Anesthesia Type:  general    Procedure: Exploratory laparotomy, removal of pelvic mass, unilateral salpingo-oophorectomy, possible cancer staging with hysterectomy, bilateral salpingo-oophorectomy, biopsies, lymph nodes     Consult: ov     RECORDS REQUESTED FROM:       Primary Care Provider: [facility]    Most recent EKG+ [id number]

## 2020-09-02 ENCOUNTER — TELEPHONE (OUTPATIENT)
Dept: ONCOLOGY | Facility: CLINIC | Age: 45
End: 2020-09-02

## 2020-09-02 NOTE — TELEPHONE ENCOUNTER
Called the phone number for Sadi's sister, Nathalie, with an . Left a detailed voicemail with surgical information and contact number.      The number listed for Sadi was called on 8/27 with an  but the voicemail was in Maltese. A detailed voicemail was also left on that day on Sadi's phone with surgical information.     No contact from the patient yet.     Will send out a surgery packet via US mail.

## 2020-09-11 ENCOUNTER — TELEPHONE (OUTPATIENT)
Dept: ONCOLOGY | Facility: CLINIC | Age: 45
End: 2020-09-11

## 2020-09-11 PROBLEM — M25.561 PAIN IN BOTH KNEES: Status: ACTIVE | Noted: 2019-03-01

## 2020-09-11 PROBLEM — G47.09 OTHER INSOMNIA: Status: ACTIVE | Noted: 2018-11-28

## 2020-09-11 PROBLEM — R30.0 DYSURIA: Status: ACTIVE | Noted: 2019-03-01

## 2020-09-11 PROBLEM — I83.813 VARICOSE VEINS OF BOTH LOWER EXTREMITIES WITH PAIN: Status: ACTIVE | Noted: 2019-03-01

## 2020-09-11 PROBLEM — Z79.899 ENCOUNTER FOR LONG-TERM CURRENT USE OF HIGH RISK MEDICATION: Status: ACTIVE | Noted: 2020-06-29

## 2020-09-11 PROBLEM — Z60.3: Status: ACTIVE | Noted: 2017-08-03

## 2020-09-11 PROBLEM — M25.562 PAIN IN BOTH KNEES: Status: ACTIVE | Noted: 2019-03-01

## 2020-09-11 NOTE — TELEPHONE ENCOUNTER
Called with  attempt #3.    Sadi's phone would not allow voicemails.    Called her sister's phone and got ahold of her.     She stated she received the letter I mailed out on 9/2. Patient did not call us to confirm dates after multiple attempts.    She at first stated 8:15 AM Monday for PAC is too early, but I explained there are no other options now. I did explain that if she does not make it to the appointments Monday, surgery will be postponed and could be 3-4 weeks.    Patient said she will be here Monday and has no further questions.

## 2020-09-14 ENCOUNTER — ANESTHESIA EVENT (OUTPATIENT)
Dept: SURGERY | Facility: CLINIC | Age: 45
End: 2020-09-14
Payer: COMMERCIAL

## 2020-09-14 ENCOUNTER — PRE VISIT (OUTPATIENT)
Dept: SURGERY | Facility: CLINIC | Age: 45
End: 2020-09-14

## 2020-09-14 ENCOUNTER — OFFICE VISIT (OUTPATIENT)
Dept: SURGERY | Facility: CLINIC | Age: 45
End: 2020-09-14
Attending: OBSTETRICS & GYNECOLOGY
Payer: COMMERCIAL

## 2020-09-14 VITALS
BODY MASS INDEX: 39.39 KG/M2 | RESPIRATION RATE: 16 BRPM | HEART RATE: 73 BPM | OXYGEN SATURATION: 99 % | WEIGHT: 251 LBS | TEMPERATURE: 97.6 F | HEIGHT: 67 IN | DIASTOLIC BLOOD PRESSURE: 75 MMHG | SYSTOLIC BLOOD PRESSURE: 108 MMHG

## 2020-09-14 DIAGNOSIS — N94.89 ADNEXAL MASS: ICD-10-CM

## 2020-09-14 DIAGNOSIS — Z01.818 PRE-OP EXAMINATION: Primary | ICD-10-CM

## 2020-09-14 DIAGNOSIS — Z11.59 ENCOUNTER FOR SCREENING FOR OTHER VIRAL DISEASES: ICD-10-CM

## 2020-09-14 LAB
ABO + RH BLD: NORMAL
ABO + RH BLD: NORMAL
AFP SERPL-MCNC: 2.6 UG/L (ref 0–8)
ALBUMIN SERPL-MCNC: 3.1 G/DL (ref 3.4–5)
ALP SERPL-CCNC: 81 U/L (ref 40–150)
ALT SERPL W P-5'-P-CCNC: 20 U/L (ref 0–50)
ANION GAP SERPL CALCULATED.3IONS-SCNC: 5 MMOL/L (ref 3–14)
AST SERPL W P-5'-P-CCNC: 23 U/L (ref 0–45)
BASOPHILS # BLD AUTO: 0.1 10E9/L (ref 0–0.2)
BASOPHILS NFR BLD AUTO: 1 %
BILIRUB SERPL-MCNC: 0.6 MG/DL (ref 0.2–1.3)
BLD GP AB SCN SERPL QL: NORMAL
BLOOD BANK CMNT PATIENT-IMP: NORMAL
BUN SERPL-MCNC: 6 MG/DL (ref 7–30)
CALCIUM SERPL-MCNC: 8.7 MG/DL (ref 8.5–10.1)
CANCER AG125 SERPL-ACNC: 8 U/ML (ref 0–30)
CEA SERPL-MCNC: 0.9 UG/L (ref 0–2.5)
CHLORIDE SERPL-SCNC: 108 MMOL/L (ref 94–109)
CO2 SERPL-SCNC: 27 MMOL/L (ref 20–32)
CREAT SERPL-MCNC: 0.61 MG/DL (ref 0.52–1.04)
DIFFERENTIAL METHOD BLD: ABNORMAL
EOSINOPHIL # BLD AUTO: 0.2 10E9/L (ref 0–0.7)
EOSINOPHIL NFR BLD AUTO: 4 %
ERYTHROCYTE [DISTWIDTH] IN BLOOD BY AUTOMATED COUNT: 12.5 % (ref 10–15)
GFR SERPL CREATININE-BSD FRML MDRD: >90 ML/MIN/{1.73_M2}
GLUCOSE SERPL-MCNC: 97 MG/DL (ref 70–99)
HCG-TM SERPL-ACNC: <3 IU/L
HCT VFR BLD AUTO: 36.3 % (ref 35–47)
HGB BLD-MCNC: 11.6 G/DL (ref 11.7–15.7)
IMM GRANULOCYTES # BLD: 0 10E9/L (ref 0–0.4)
IMM GRANULOCYTES NFR BLD: 0.2 %
LDH SERPL L TO P-CCNC: 184 U/L (ref 81–234)
LYMPHOCYTES # BLD AUTO: 2 10E9/L (ref 0.8–5.3)
LYMPHOCYTES NFR BLD AUTO: 34 %
MCH RBC QN AUTO: 30.5 PG (ref 26.5–33)
MCHC RBC AUTO-ENTMCNC: 32 G/DL (ref 31.5–36.5)
MCV RBC AUTO: 96 FL (ref 78–100)
MONOCYTES # BLD AUTO: 0.3 10E9/L (ref 0–1.3)
MONOCYTES NFR BLD AUTO: 5.2 %
NEUTROPHILS # BLD AUTO: 3.2 10E9/L (ref 1.6–8.3)
NEUTROPHILS NFR BLD AUTO: 55.6 %
NRBC # BLD AUTO: 0 10*3/UL
NRBC BLD AUTO-RTO: 0 /100
PLATELET # BLD AUTO: 401 10E9/L (ref 150–450)
POTASSIUM SERPL-SCNC: 3.7 MMOL/L (ref 3.4–5.3)
PROT SERPL-MCNC: 8.3 G/DL (ref 6.8–8.8)
RBC # BLD AUTO: 3.8 10E12/L (ref 3.8–5.2)
SARS-COV-2 RNA SPEC QL NAA+PROBE: NOT DETECTED
SODIUM SERPL-SCNC: 140 MMOL/L (ref 133–144)
SPECIMEN EXP DATE BLD: NORMAL
SPECIMEN SOURCE: NORMAL
WBC # BLD AUTO: 5.8 10E9/L (ref 4–11)

## 2020-09-14 RX ORDER — FAMOTIDINE 20 MG
TABLET ORAL EVERY MORNING
COMMUNITY

## 2020-09-14 ASSESSMENT — MIFFLIN-ST. JEOR: SCORE: 1821.16

## 2020-09-14 ASSESSMENT — PAIN SCALES - GENERAL: PAINLEVEL: NO PAIN (0)

## 2020-09-14 ASSESSMENT — LIFESTYLE VARIABLES: TOBACCO_USE: 0

## 2020-09-14 ASSESSMENT — ENCOUNTER SYMPTOMS: SEIZURES: 0

## 2020-09-14 NOTE — ANESTHESIA PREPROCEDURE EVALUATION
Anesthesia Pre-Procedure Evaluation    Patient: Kadeem Nicholas   MRN:     [id number] Gender:   female   Age:    45 year old :      1975        Preoperative Diagnosis: Adnexal mass [N94.89]   Procedure(s):  Exploratory laparotomy, removal of pelvic mass, unilateral salpingo-oophorectomy, possible cancer staging with hysterectomy, bilateral salpingo-oophorectomy, biopsies, lymph nodes   Results for KADEEM NICHOLAS (MRN [id number]) as of 2020 10:43   Ref. Range 2020 09:27   Sodium Latest Ref Range: 133 - 144 mmol/L 140   Potassium Latest Ref Range: 3.4 - 5.3 mmol/L 3.7   Chloride Latest Ref Range: 94 - 109 mmol/L 108   Carbon Dioxide Latest Ref Range: 20 - 32 mmol/L 27   Urea Nitrogen Latest Ref Range: 7 - 30 mg/dL 6 (L)   Creatinine Latest Ref Range: 0.52 - 1.04 mg/dL 0.61   GFR Estimate Latest Ref Range: >60 mL/min/1.73_m2 >90   GFR Estimate If Black Latest Ref Range: >60 mL/min/1.73_m2 >90   Calcium Latest Ref Range: 8.5 - 10.1 mg/dL 8.7   Anion Gap Latest Ref Range: 3 - 14 mmol/L 5   Albumin Latest Ref Range: 3.4 - 5.0 g/dL 3.1 (L)   Protein Total Latest Ref Range: 6.8 - 8.8 g/dL 8.3   Bilirubin Total Latest Ref Range: 0.2 - 1.3 mg/dL 0.6   Alkaline Phosphatase Latest Ref Range: 40 - 150 U/L 81   ALT Latest Ref Range: 0 - 50 U/L 20   AST Latest Ref Range: 0 - 45 U/L 23   Lactate Dehydrogenase Latest Ref Range: 81 - 234 U/L 184   Glucose Latest Ref Range: 70 - 99 mg/dL 97   WBC Latest Ref Range: 4.0 - 11.0 10e9/L 5.8   Hemoglobin Latest Ref Range: 11.7 - 15.7 g/dL 11.6 (L)   Hematocrit Latest Ref Range: 35.0 - 47.0 % 36.3   Platelet Count Latest Ref Range: 150 - 450 10e9/L 401   RBC Count Latest Ref Range: 3.8 - 5.2 10e12/L 3.80   MCV Latest Ref Range: 78 - 100 fl 96   MCH Latest Ref Range: 26.5 - 33.0 pg 30.5   MCHC Latest Ref Range: 31.5 - 36.5 g/dL 32.0   RDW Latest Ref Range: 10.0 - 15.0 % 12.5   Diff Method Unknown Automated Method   % Neutrophils Latest Units: % 55.6   % Lymphocytes  "Latest Units: % 34.0   % Monocytes Latest Units: % 5.2   % Eosinophils Latest Units: % 4.0   % Basophils Latest Units: % 1.0   % Immature Granulocytes Latest Units: % 0.2   Nucleated RBCs Latest Ref Range: 0 /100 0   Absolute Neutrophil Latest Ref Range: 1.6 - 8.3 10e9/L 3.2   Absolute Lymphocytes Latest Ref Range: 0.8 - 5.3 10e9/L 2.0   Absolute Monocytes Latest Ref Range: 0.0 - 1.3 10e9/L 0.3   Absolute Eosinophils Latest Ref Range: 0.0 - 0.7 10e9/L 0.2   Absolute Basophils Latest Ref Range: 0.0 - 0.2 10e9/L 0.1   Abs Immature Granulocytes Latest Ref Range: 0 - 0.4 10e9/L 0.0   Absolute Nucleated RBC Unknown 0.0   Hgb noted for anemia. Type and screen completed today as well.     Preop Vitals    BP Readings from Last 3 Encounters:   08/25/20 92/65   07/26/20 116/57   01/13/20 100/69    Pulse Readings from Last 3 Encounters:   08/25/20 87   07/26/20 95   01/13/20 65      Resp Readings from Last 3 Encounters:   07/26/20 18   12/08/19 16   09/30/17 18    SpO2 Readings from Last 3 Encounters:   08/25/20 100%   07/26/20 100%   12/08/19 98%      Temp Readings from Last 1 Encounters:   08/25/20 98.1  F (36.7  C)    Ht Readings from Last 1 Encounters:   01/13/20 1.625 m (5' 3.98\")      Wt Readings from Last 1 Encounters:   08/25/20 104.8 kg (231 lb)    Estimated body mass index is 39.68 kg/m  as calculated from the following:    Height as of 1/13/20: 1.625 m (5' 3.98\").    Weight as of 8/25/20: 104.8 kg (231 lb).     LDA:        Past Medical History:   Diagnosis Date     Obesity, Class III, BMI 40-49.9 (morbid obesity) (H)      Ovarian cystic mass      Schizoaffective disorder, bipolar type (H)       Past Surgical History:   Procedure Laterality Date     CHOLECYSTECTOMY        Allergies   Allergen Reactions     Alcohol [Ethanol]      Gelatin      No Known Drug Allergy         Anesthesia Evaluation     . Pt has had prior anesthetic. Type: General    No history of anesthetic complications          ROS/MED " HX    ENT/Pulmonary:  - neg pulmonary ROS    (-) tobacco use   Neurologic:  - neg neurologic ROS    (-) seizures, CVA, TIA and migraines   Cardiovascular: Comment: Varicose veins    (+) ----. : . . . :. . No previous cardiac testing       METS/Exercise Tolerance:  >4 METS   Hematologic:  - neg hematologic  ROS      (-) history of blood clots, anemia and History of Transfusion   Musculoskeletal:  - neg musculoskeletal ROS       GI/Hepatic:     (+) GERD (patient reports only symptoms with spicy foods) Other, cholecystitis/cholelithiasis (s/p cholecystectomy),       Renal/Genitourinary:  - ROS Renal section negative       Endo:     (+) Obesity, .      Psychiatric:     (+) psychiatric history other (comment) (schizoaffective disorder, bipolar)      Infectious Disease:  - neg infectious disease ROS       Malignancy:   (+)   Adnexal mass        Other:    (+) Possibly pregnant LMP: 9/7/20, no H/O Chronic Pain,no other significant disability                        PHYSICAL EXAM:   Mental Status/Neuro: A/A/O; Age Appropriate   Airway: Facies: Feasible  Mallampati: I  Mouth/Opening: Full  TM distance: > 6 cm  Neck ROM: Limited   Respiratory: Auscultation: CTAB     Resp. Rate: Normal     Resp. Effort: Normal      CV: Rhythm: Regular  Heart: Normal Sounds  Edema: None  Pulses: Normal  Neck: Normal   Comments:      Dental: Normal Dentition                Assessment:   ASA SCORE: 3      Smoking Status:  Non-Smoker/Unknown   NPO Status: NPO Appropriate     Plan:   Anes. Type:  General; Peripheral Nerve Block     Block Details: TAP   Pre-Medication: Acetaminophen   Induction:  IV (Standard)   Airway: ETT; Oral   Access/Monitoring: PIV; 2nd PIV   Maintenance: Balanced     Postop Plan:   Postop Pain: Opioids  Postop Sedation/Airway: Not planned  Disposition: Inpatient/Admit     PONV Management:   Adult Risk Factors: Female, Non-Smoker, Postop Opioids   Prevention: Ondansetron, Dexamethasone     CONSENT: Via    Plan and  risks discussed with: Patient   Blood Products: Consented (ALL Blood Products)                PAC Discussion and Assessment    ASA Classification: 2  Case is suitable for: [place]  Anesthetic techniques and relevant risks discussed: GA with regional block for post-op pain control  Invasive monitoring and risk discussed:   Types:   Possibility and Risk of blood transfusion discussed:   NPO instructions given:   Additional anesthetic preparation and risks discussed:   Needs early admission to pre-op area:   Other:     PAC Resident/NP Anesthesia Assessment:  Sadi is a 45 year old woman who is scheduled for Exploratory laparotomy, removal of pelvic mass, unilateral salpingo-oophorectomy, possible cancer staging with hysterectomy, bilateral salpingo-oophorectomy, biopsies, lymph nodes  on 9/16/20 by Dr. oSlis in treatment of adnexal mass.  PAC referral for risk assessment and optimization for anesthesia with comorbid conditions of GERD, obesity, schizoaffective disorder bipolar type:    Pre-operative considerations:  1.  Cardiac:  Functional status- METS >4, the patient walks on the treadmill for 30 minutes a day. She also has 3 kids which keeps her busy and active.  Intermediate risk surgery with 0.9% (RCRI #) risk of major adverse cardiac event.   ~ The patient has no cardiac diagnosis with good exercise tolerance and denies symptoms. No further testing indicated.     2.  Pulm:  Airway feasible.  DAVION risk: Low    3. Endo: Obesity, BMI 39 - consideration for safe lifting techniques.     4. GI:  Risk of PONV score = 3.  If > 2, anti-emetic intervention recommended.  ~ GERD - the patient reports she only has symptoms with spicy foods and not currently on medications.     5. : Adnexal mass - procedure as above. Okay to continue tylenol    6. Psych: Schizoaffective disorder - bipolar type - continue zyprexa and risperdal.     VTE risk: 0.26%-1.8%    Patient is optimized and is acceptable candidate for the proposed  procedure.  No further diagnostic evaluation is needed.     For further details of assessment, testing, and physical exam please see H and P completed on same date.    Carolina Latham PA-C          Mid-Level Provider/Resident: Carolina Latham PA-C  Date: 9/14/20  Time:     Attending Anesthesiologist Anesthesia Assessment:        Anesthesiologist:   Date:   Time:   Pass/Fail:   Disposition:     PAC Pharmacist Assessment:        Pharmacist:   Date:   Time:    Carolina Latham PA-C

## 2020-09-14 NOTE — PATIENT INSTRUCTIONS
Preparing for Your Surgery      Name:  Sadi Bedoya   MRN:  [id number]   :  1975   Today's Date:  2020       Arriving for surgery:  Surgery date:  20  Arrival time:  8:00 am    Restrictions due to COVID 19:  Patients are allowed one visitor in the pre-op period  All visitors must wear a mask  No visitors under 18  No ill visitors   parking is not available     Please come to:       [facility] [address] [place] [address]  [id number]     -    Please proceed to the Surgery Lounge on the 3rd floor. [phone]?     - ?If you are in need of directions, wheelchair or escort please stop at the Information Desk in the lobby.  Inform the information person that you are here for surgery; a wheelchair and escort will be provided to the Surgery Lounge .?     What can I eat or drink?  -  You may eat and drink normally for up to 8 hours before your surgery. (Until 2:30 am)  -  You may have clear liquids until 2 hours before surgery. (Until 8:00 am)  Examples of clear liquids:  Water  Clear broth  Juices (apple, white grape, white cranberry  and cider) without pulp  Noncarbonated, powder based beverages  (lemonade and Bhaskar-Aid)  Sodas (Sprite, 7-Up, ginger ale and seltzer)  Coffee or tea (without milk or cream)  Gatorade    -  No Alcohol for at least 24 hours before surgery     Which medicines can I take?    Hold Aspirin for 7 days before surgery.   Hold Multivitamins for 7 days before surgery.  Hold Supplements for 7 days before surgery.  Hold Ibuprofen (Advil, Motrin) for 1 day before surgery--unless otherwise directed by surgeon.  Hold Naproxen (Aleve) for 4 days before surgery.  -  PLEASE TAKE these medications the day of surgery:  Acetaminophen (Tylenol)    How do I prepare myself?  - Please take 2 showers before surgery using Scrubcare or Hibiclens soap.    Use this soap only from the neck to your toes.     Leave the soap on your skin for one  minute--then rinse thoroughly.      You may use your own shampoo and conditioner; no other hair products.   - Please remove all jewelry and body piercings.  - No lotions, deodorants or fragrance.  - No makeup or fingernail polish.   - Bring your ID and insurance card.    - All patients are required to have a Covid-19 test within 4 days of surgery/procedure.      -Patients will be contacted by the [facility] scheduling team within 1 week of surgery to make an appointment.      - Patients may call the Scheduling team at [phone] if they have not been scheduled within 4 days of  surgery.      ALL PATIENTS GOING HOME THE SAME DAY OF SURGERY ARE REQUIRED TO HAVE A RESPONSIBLE ADULT TO DRIVE AND BE IN ATTENDANCE WITH THEM FOR 24 HOURS FOLLOWING SURGERY     Questions or Concerns:    - For any questions regarding the day of surgery or your hospital stay, please contact the Pre Admission Nursing Office at [phone].       - If you have health changes between today and your surgery please call your surgeon.       For questions after surgery please call your surgeons office.

## 2020-09-14 NOTE — H&P
Pre-Operative H & P     CC:  Preoperative exam to assess for increased cardiopulmonary risk while undergoing surgery and anesthesia.    Date of Encounter: 9/14/2020  Primary Care Physician:  Kierra, [facility]     Reason for visit: pre operative examination, Adnexal mass    HPI  Sadi Bedoya is a 45 year old female who presents for pre-operative H & P in preparation for Exploratory laparotomy, removal of pelvic mass, unilateral salpingo-oophorectomy, possible cancer staging with hysterectomy, bilateral salpingo-oophorectomy, biopsies, lymph nodes with Dr. Solis on 9/16/20 at [facility].     The patient is a 45 year old woman who has a past medical history for obesity, GERD and schizoaffective disorder. She presented to the ER in December 2019 for abdominal pain and was found to have a cystic mass but given her desire for further pregnancies no further intervention was taken. She then came back to the ER in March, July and August for continued abdominal pain with nausea and vomiting. She was finally seen in the clinic on 8/25/20 by Dr. Solis and they discussed her treatment options. The patient continues to have mild generalized abdominal pain. The patient is now scheduled for the procedure as above.     History is obtained from the patient and chart review    Past Medical History  Past Medical History:   Diagnosis Date     Obesity, Class III, BMI 40-49.9 (morbid obesity) (H)      Ovarian cystic mass      Schizoaffective disorder, bipolar type (H)        Past Surgical History  Past Surgical History:   Procedure Laterality Date     CHOLECYSTECTOMY         Hx of Blood transfusions/reactions: denies     Hx of abnormal bleeding or anti-platelet use: none    Menstrual history: Patient's last menstrual period was 09/07/2020 (approximate).:     Steroid use in the last year: none    Personal or FH with difficulty with Anesthesia:  denies    Prior to  Admission Medications  Current Outpatient Medications   Medication Sig Dispense Refill     Acetaminophen (TYLENOL PO) Take by mouth as needed        OLANZapine (ZYPREXA) 5 MG tablet Take 5 mg by mouth At Bedtime        Prenatal Vit-Fe Fumarate-FA (PRENATAL MULTIVITAMIN PLUS IRON) 27-0.8 MG TABS per tablet Take 1 tablet by mouth every morning        risperiDONE (RISPERDAL) 2 MG tablet Take 2 mg by mouth At Bedtime        Vitamin D, Cholecalciferol, 25 MCG (1000 UT) CAPS Take by mouth every morning       order for DME Equipment being ordered: compression stockings knee high moderate compression 2 each 1     order for DME Equipment being ordered: shoes with arch support and heal cushioning 1 each 0     vitamin D (ERGOCALCIFEROL) 69948 UNIT capsule Take 50,000 Units by mouth every morning          Allergies  Allergies   Allergen Reactions     Alcohol [Ethanol]      Gelatin      No Known Drug Allergy        Social History  Social History     Socioeconomic History     Marital status:      Spouse name: Not on file     Number of children: 3     Years of education: Not on file     Highest education level: Not on file   Occupational History     Not on file   Social Needs     Financial resource strain: Not on file     Food insecurity     Worry: Not on file     Inability: Not on file     Transportation needs     Medical: Not on file     Non-medical: Not on file   Tobacco Use     Smoking status: Never Smoker     Smokeless tobacco: Never Used   Substance and Sexual Activity     Alcohol use: No     Drug use: No     Sexual activity: Yes     Partners: Male     Birth control/protection: None   Lifestyle     Physical activity     Days per week: Not on file     Minutes per session: Not on file     Stress: Not on file   Relationships     Social connections     Talks on phone: Not on file     Gets together: Not on file     Attends Restorationist service: Not on file     Active member of club or organization: Not on file     Attends  "meetings of clubs or organizations: Not on file     Relationship status: Not on file     Intimate partner violence     Fear of current or ex partner: Not on file     Emotionally abused: Not on file     Physically abused: Not on file     Forced sexual activity: Not on file   Other Topics Concern     Parent/sibling w/ CABG, MI or angioplasty before 65F 55M? Not Asked   Social History Narrative     Not on file       Family History  Family History   Problem Relation Age of Onset     Liver Disease Mother        Review of Systems  ROS/MED HX    ENT/Pulmonary:  - neg pulmonary ROS    (-) tobacco use   Neurologic:  - neg neurologic ROS    (-) seizures, CVA, TIA and migraines   Cardiovascular: Comment: Varicose veins    (+) ----. : . . . :. . No previous cardiac testing       METS/Exercise Tolerance:  >4 METS   Hematologic:  - neg hematologic  ROS      (-) history of blood clots, anemia and History of Transfusion   Musculoskeletal:  - neg musculoskeletal ROS       GI/Hepatic:     (+) GERD (patient reports only symptoms with spicy foods) Other, cholecystitis/cholelithiasis (s/p cholecystectomy),       Renal/Genitourinary:  - ROS Renal section negative       Endo:     (+) Obesity, .      Psychiatric:     (+) psychiatric history other (comment) (schizoaffective disorder, bipolar)      Infectious Disease:  - neg infectious disease ROS       Malignancy:   (+)   Adnexal mass        Other:    (+) Possibly pregnant LMP: 9/7/20, no H/O Chronic Pain,no other significant disability        The complete review of systems is negative other than noted in the HPI or here.   Temp: 97.6  F (36.4  C) Temp src: Oral BP: 108/75 Pulse: 73   Resp: 16 SpO2: 99 %         251 lbs 0 oz  5' 7\"[pt reported[   Body mass index is 39.31 kg/m .       Physical Exam  Constitutional: Awake, alert, cooperative, no apparent distress, and appears stated age.  Eyes: Pupils equal, round and reactive to light, extra ocular muscles intact, sclera clear, conjunctiva " normal.  HENT: Normocephalic, oral pharynx with moist mucus membranes, good dentition. No goiter appreciated.   Respiratory: Clear to auscultation bilaterally, no crackles or wheezing.  Cardiovascular: Regular rate and rhythm, normal S1 and S2, and no murmur noted.  Carotids +2, no bruits. No edema. Palpable pulses to radial  DP and PT arteries. Varicose veins  GI: Normal bowel sounds, soft, non-distended, obese, large pelvic mass, firm but no peritoneal signs.   Lymph/Hematologic: No cervical lymphadenopathy and no supraclavicular lymphadenopathy.  Genitourinary:  defer  Skin: Warm and dry.  No rashes at anticipated surgical site.   Musculoskeletal: Slightly limited ROM of neck. There is no redness, warmth, or swelling of the joints. Gross motor strength is normal.    Neurologic: Awake, alert, oriented to name, place and time. Cranial nerves II-XII are grossly intact. Gait is normal.   Neuropsychiatric: Calm, cooperative. Normal affect.     Labs: (personally reviewed)  Results for KADEEM NICHOLAS (MRN [id number]) as of 9/14/2020 10:43   Ref. Range 9/14/2020 09:27   Sodium Latest Ref Range: 133 - 144 mmol/L 140   Potassium Latest Ref Range: 3.4 - 5.3 mmol/L 3.7   Chloride Latest Ref Range: 94 - 109 mmol/L 108   Carbon Dioxide Latest Ref Range: 20 - 32 mmol/L 27   Urea Nitrogen Latest Ref Range: 7 - 30 mg/dL 6 (L)   Creatinine Latest Ref Range: 0.52 - 1.04 mg/dL 0.61   GFR Estimate Latest Ref Range: >60 mL/min/1.73_m2 >90   GFR Estimate If Black Latest Ref Range: >60 mL/min/1.73_m2 >90   Calcium Latest Ref Range: 8.5 - 10.1 mg/dL 8.7   Anion Gap Latest Ref Range: 3 - 14 mmol/L 5   Albumin Latest Ref Range: 3.4 - 5.0 g/dL 3.1 (L)   Protein Total Latest Ref Range: 6.8 - 8.8 g/dL 8.3   Bilirubin Total Latest Ref Range: 0.2 - 1.3 mg/dL 0.6   Alkaline Phosphatase Latest Ref Range: 40 - 150 U/L 81   ALT Latest Ref Range: 0 - 50 U/L 20   AST Latest Ref Range: 0 - 45 U/L 23   Lactate Dehydrogenase Latest Ref Range: 81 -  234 U/L 184   Glucose Latest Ref Range: 70 - 99 mg/dL 97   WBC Latest Ref Range: 4.0 - 11.0 10e9/L 5.8   Hemoglobin Latest Ref Range: 11.7 - 15.7 g/dL 11.6 (L)   Hematocrit Latest Ref Range: 35.0 - 47.0 % 36.3   Platelet Count Latest Ref Range: 150 - 450 10e9/L 401   RBC Count Latest Ref Range: 3.8 - 5.2 10e12/L 3.80   MCV Latest Ref Range: 78 - 100 fl 96   MCH Latest Ref Range: 26.5 - 33.0 pg 30.5   MCHC Latest Ref Range: 31.5 - 36.5 g/dL 32.0   RDW Latest Ref Range: 10.0 - 15.0 % 12.5   Diff Method Unknown Automated Method   % Neutrophils Latest Units: % 55.6   % Lymphocytes Latest Units: % 34.0   % Monocytes Latest Units: % 5.2   % Eosinophils Latest Units: % 4.0   % Basophils Latest Units: % 1.0   % Immature Granulocytes Latest Units: % 0.2   Nucleated RBCs Latest Ref Range: 0 /100 0   Absolute Neutrophil Latest Ref Range: 1.6 - 8.3 10e9/L 3.2   Absolute Lymphocytes Latest Ref Range: 0.8 - 5.3 10e9/L 2.0   Absolute Monocytes Latest Ref Range: 0.0 - 1.3 10e9/L 0.3   Absolute Eosinophils Latest Ref Range: 0.0 - 0.7 10e9/L 0.2   Absolute Basophils Latest Ref Range: 0.0 - 0.2 10e9/L 0.1   Abs Immature Granulocytes Latest Ref Range: 0 - 0.4 10e9/L 0.0   Absolute Nucleated RBC Unknown 0.0   Hgb noted for anemia. Type and screen completed today as well.     EKG: Not indicated  The patient's records and results personally reviewed by this provider.     Outside records reviewed from: care everywhere     ASSESSMENT and PLAN  Sadi is a 45 year old woman who is scheduled for Exploratory laparotomy, removal of pelvic mass, unilateral salpingo-oophorectomy, possible cancer staging with hysterectomy, bilateral salpingo-oophorectomy, biopsies, lymph nodes  on 9/16/20 by Dr. Solis in treatment of adnexal mass.  PAC referral for risk assessment and optimization for anesthesia with comorbid conditions of GERD, obesity, schizoaffective disorder bipolar type:    Pre-operative considerations:  1.  Cardiac:  Functional status- METS  >4, the patient walks on the treadmill for 30 minutes a day. She also has 3 kids which keeps her busy and active.  Intermediate risk surgery with 0.9% (RCRI #) risk of major adverse cardiac event.   ~ The patient has no cardiac diagnosis with good exercise tolerance and denies symptoms. No further testing indicated.     2.  Pulm:  Airway feasible.  DAVION risk: Low    3. Endo: Obesity, BMI 39 - consideration for safe lifting techniques.     4. GI:  Risk of PONV score = 3.  If > 2, anti-emetic intervention recommended.  ~ GERD - the patient reports she only has symptoms with spicy foods and not currently on medications.     5. : Adnexal mass - procedure as above. Okay to continue tylenol    6. Psych: Schizoaffective disorder - bipolar type - continue zyprexa and risperdal.     VTE risk: 0.26%-1.8%    The patient is optimized for their procedure. AVS with information on surgery time/arrival time, meds and NPO status given by nursing staff.        Carolina Latham PA-C  Preoperative Assessment Center  [facility]  [facility] and [facility]  Phone: [phone]  Fax: [phone]

## 2020-09-15 LAB — CANCER AG19-9 SERPL-ACNC: 44 U/ML (ref 0–37)

## 2020-09-16 ENCOUNTER — HOSPITAL ENCOUNTER (INPATIENT)
Facility: CLINIC | Age: 45
LOS: 2 days | Discharge: HOME | End: 2020-09-18
Attending: OBSTETRICS & GYNECOLOGY | Admitting: OBSTETRICS & GYNECOLOGY
Payer: COMMERCIAL

## 2020-09-16 ENCOUNTER — ANESTHESIA (OUTPATIENT)
Dept: SURGERY | Facility: CLINIC | Age: 45
End: 2020-09-16
Payer: COMMERCIAL

## 2020-09-16 ENCOUNTER — ANCILLARY PROCEDURE (OUTPATIENT)
Dept: ULTRASOUND IMAGING | Facility: CLINIC | Age: 45
End: 2020-09-16
Attending: STUDENT IN AN ORGANIZED HEALTH CARE EDUCATION/TRAINING PROGRAM
Payer: COMMERCIAL

## 2020-09-16 DIAGNOSIS — N94.89 ADNEXAL MASS: ICD-10-CM

## 2020-09-16 DIAGNOSIS — Z98.890 S/P LAPAROTOMY: Primary | ICD-10-CM

## 2020-09-16 LAB
GLUCOSE BLDC GLUCOMTR-MCNC: 84 MG/DL (ref 70–99)
HCG UR QL: NEGATIVE
INHIBIN B SERPL-MCNC: <10 PG/ML

## 2020-09-16 PROCEDURE — 25000128 H RX IP 250 OP 636: Performed by: STUDENT IN AN ORGANIZED HEALTH CARE EDUCATION/TRAINING PROGRAM

## 2020-09-16 PROCEDURE — 25000132 ZZH RX MED GY IP 250 OP 250 PS 637: Performed by: NURSE ANESTHETIST, CERTIFIED REGISTERED

## 2020-09-16 PROCEDURE — 88307 TISSUE EXAM BY PATHOLOGIST: CPT | Performed by: OBSTETRICS & GYNECOLOGY

## 2020-09-16 PROCEDURE — 37000009 ZZH ANESTHESIA TECHNICAL FEE, EACH ADDTL 15 MIN: Performed by: OBSTETRICS & GYNECOLOGY

## 2020-09-16 PROCEDURE — 88112 CYTOPATH CELL ENHANCE TECH: CPT | Performed by: OBSTETRICS & GYNECOLOGY

## 2020-09-16 PROCEDURE — 88331 PATH CONSLTJ SURG 1 BLK 1SPC: CPT | Performed by: OBSTETRICS & GYNECOLOGY

## 2020-09-16 PROCEDURE — 25800030 ZZH RX IP 258 OP 636: Performed by: OBSTETRICS & GYNECOLOGY

## 2020-09-16 PROCEDURE — 25000125 ZZHC RX 250: Performed by: STUDENT IN AN ORGANIZED HEALTH CARE EDUCATION/TRAINING PROGRAM

## 2020-09-16 PROCEDURE — 25000128 H RX IP 250 OP 636: Performed by: NURSE ANESTHETIST, CERTIFIED REGISTERED

## 2020-09-16 PROCEDURE — 71000014 ZZH RECOVERY PHASE 1 LEVEL 2 FIRST HR: Performed by: OBSTETRICS & GYNECOLOGY

## 2020-09-16 PROCEDURE — 25000132 ZZH RX MED GY IP 250 OP 250 PS 637: Performed by: OBSTETRICS & GYNECOLOGY

## 2020-09-16 PROCEDURE — C9290 INJ, BUPIVACAINE LIPOSOME: HCPCS | Performed by: STUDENT IN AN ORGANIZED HEALTH CARE EDUCATION/TRAINING PROGRAM

## 2020-09-16 PROCEDURE — 25000125 ZZHC RX 250: Performed by: OBSTETRICS & GYNECOLOGY

## 2020-09-16 PROCEDURE — 25000132 ZZH RX MED GY IP 250 OP 250 PS 637: Performed by: STUDENT IN AN ORGANIZED HEALTH CARE EDUCATION/TRAINING PROGRAM

## 2020-09-16 PROCEDURE — 0DTJ0ZZ RESECTION OF APPENDIX, OPEN APPROACH: ICD-10-PCS | Performed by: OBSTETRICS & GYNECOLOGY

## 2020-09-16 PROCEDURE — 0DTU0ZZ RESECTION OF OMENTUM, OPEN APPROACH: ICD-10-PCS | Performed by: OBSTETRICS & GYNECOLOGY

## 2020-09-16 PROCEDURE — 88304 TISSUE EXAM BY PATHOLOGIST: CPT | Performed by: OBSTETRICS & GYNECOLOGY

## 2020-09-16 PROCEDURE — 36000062 ZZH SURGERY LEVEL 4 1ST 30 MIN - UMMC: Performed by: OBSTETRICS & GYNECOLOGY

## 2020-09-16 PROCEDURE — 25000125 ZZHC RX 250: Performed by: NURSE ANESTHETIST, CERTIFIED REGISTERED

## 2020-09-16 PROCEDURE — 88302 TISSUE EXAM BY PATHOLOGIST: CPT | Performed by: OBSTETRICS & GYNECOLOGY

## 2020-09-16 PROCEDURE — 00000155 ZZHCL STATISTIC H-CELL BLOCK W/STAIN: Performed by: OBSTETRICS & GYNECOLOGY

## 2020-09-16 PROCEDURE — 0UT00ZZ RESECTION OF RIGHT OVARY, OPEN APPROACH: ICD-10-PCS | Performed by: OBSTETRICS & GYNECOLOGY

## 2020-09-16 PROCEDURE — 25000128 H RX IP 250 OP 636: Performed by: OBSTETRICS & GYNECOLOGY

## 2020-09-16 PROCEDURE — 81025 URINE PREGNANCY TEST: CPT | Performed by: ANESTHESIOLOGY

## 2020-09-16 PROCEDURE — 27210794 ZZH OR GENERAL SUPPLY STERILE: Performed by: OBSTETRICS & GYNECOLOGY

## 2020-09-16 PROCEDURE — 00000146 ZZHCL STATISTIC GLUCOSE BY METER IP

## 2020-09-16 PROCEDURE — 25000125 ZZHC RX 250: Performed by: ANESTHESIOLOGY

## 2020-09-16 PROCEDURE — 25800030 ZZH RX IP 258 OP 636: Performed by: STUDENT IN AN ORGANIZED HEALTH CARE EDUCATION/TRAINING PROGRAM

## 2020-09-16 PROCEDURE — 25800030 ZZH RX IP 258 OP 636: Performed by: NURSE ANESTHETIST, CERTIFIED REGISTERED

## 2020-09-16 PROCEDURE — 25800030 ZZH RX IP 258 OP 636: Performed by: ANESTHESIOLOGY

## 2020-09-16 PROCEDURE — 12000001 ZZH R&B MED SURG/OB UMMC

## 2020-09-16 PROCEDURE — 36000064 ZZH SURGERY LEVEL 4 EA 15 ADDTL MIN - UMMC: Performed by: OBSTETRICS & GYNECOLOGY

## 2020-09-16 PROCEDURE — 25000128 H RX IP 250 OP 636: Performed by: ANESTHESIOLOGY

## 2020-09-16 PROCEDURE — 0UT50ZZ RESECTION OF RIGHT FALLOPIAN TUBE, OPEN APPROACH: ICD-10-PCS | Performed by: OBSTETRICS & GYNECOLOGY

## 2020-09-16 PROCEDURE — 88305 TISSUE EXAM BY PATHOLOGIST: CPT | Performed by: OBSTETRICS & GYNECOLOGY

## 2020-09-16 PROCEDURE — 37000008 ZZH ANESTHESIA TECHNICAL FEE, 1ST 30 MIN: Performed by: OBSTETRICS & GYNECOLOGY

## 2020-09-16 PROCEDURE — 40000171 ZZH STATISTIC PRE-PROCEDURE ASSESSMENT III: Performed by: OBSTETRICS & GYNECOLOGY

## 2020-09-16 PROCEDURE — 25000566 ZZH SEVOFLURANE, EA 15 MIN: Performed by: OBSTETRICS & GYNECOLOGY

## 2020-09-16 PROCEDURE — 71000015 ZZH RECOVERY PHASE 1 LEVEL 2 EA ADDTL HR: Performed by: OBSTETRICS & GYNECOLOGY

## 2020-09-16 RX ORDER — NALOXONE HYDROCHLORIDE 0.4 MG/ML
.1-.4 INJECTION, SOLUTION INTRAMUSCULAR; INTRAVENOUS; SUBCUTANEOUS
Status: DISCONTINUED | OUTPATIENT
Start: 2020-09-16 | End: 2020-09-16 | Stop reason: HOSPADM

## 2020-09-16 RX ORDER — ALBUTEROL SULFATE 90 UG/1
AEROSOL, METERED RESPIRATORY (INHALATION) PRN
Status: DISCONTINUED | OUTPATIENT
Start: 2020-09-16 | End: 2020-09-16

## 2020-09-16 RX ORDER — GLYCOPYRROLATE 0.2 MG/ML
INJECTION, SOLUTION INTRAMUSCULAR; INTRAVENOUS PRN
Status: DISCONTINUED | OUTPATIENT
Start: 2020-09-16 | End: 2020-09-16

## 2020-09-16 RX ORDER — SODIUM CHLORIDE, SODIUM LACTATE, POTASSIUM CHLORIDE, CALCIUM CHLORIDE 600; 310; 30; 20 MG/100ML; MG/100ML; MG/100ML; MG/100ML
INJECTION, SOLUTION INTRAVENOUS CONTINUOUS
Status: DISCONTINUED | OUTPATIENT
Start: 2020-09-16 | End: 2020-09-16 | Stop reason: HOSPADM

## 2020-09-16 RX ORDER — TRAZODONE HYDROCHLORIDE 150 MG/1
150 TABLET ORAL PRN
COMMUNITY
Start: 2020-06-29

## 2020-09-16 RX ORDER — PROPOFOL 10 MG/ML
INJECTION, EMULSION INTRAVENOUS PRN
Status: DISCONTINUED | OUTPATIENT
Start: 2020-09-16 | End: 2020-09-16

## 2020-09-16 RX ORDER — LIDOCAINE HYDROCHLORIDE 20 MG/ML
INJECTION, SOLUTION INFILTRATION; PERINEURAL PRN
Status: DISCONTINUED | OUTPATIENT
Start: 2020-09-16 | End: 2020-09-16

## 2020-09-16 RX ORDER — ONDANSETRON 4 MG/1
4 TABLET, ORALLY DISINTEGRATING ORAL EVERY 30 MIN PRN
Status: DISCONTINUED | OUTPATIENT
Start: 2020-09-16 | End: 2020-09-16 | Stop reason: HOSPADM

## 2020-09-16 RX ORDER — FENTANYL CITRATE 50 UG/ML
25-50 INJECTION, SOLUTION INTRAMUSCULAR; INTRAVENOUS EVERY 5 MIN PRN
Status: DISCONTINUED | OUTPATIENT
Start: 2020-09-16 | End: 2020-09-16 | Stop reason: HOSPADM

## 2020-09-16 RX ORDER — SODIUM CHLORIDE, SODIUM LACTATE, POTASSIUM CHLORIDE, CALCIUM CHLORIDE 600; 310; 30; 20 MG/100ML; MG/100ML; MG/100ML; MG/100ML
INJECTION, SOLUTION INTRAVENOUS CONTINUOUS
Status: DISCONTINUED | OUTPATIENT
Start: 2020-09-16 | End: 2020-09-17

## 2020-09-16 RX ORDER — AMOXICILLIN 250 MG
2 CAPSULE ORAL 2 TIMES DAILY PRN
Status: DISCONTINUED | OUTPATIENT
Start: 2020-09-16 | End: 2020-09-18 | Stop reason: HOSPADM

## 2020-09-16 RX ORDER — ONDANSETRON 4 MG/1
4 TABLET, ORALLY DISINTEGRATING ORAL EVERY 8 HOURS
Status: DISPENSED | OUTPATIENT
Start: 2020-09-16 | End: 2020-09-17

## 2020-09-16 RX ORDER — FENTANYL CITRATE 50 UG/ML
INJECTION, SOLUTION INTRAMUSCULAR; INTRAVENOUS PRN
Status: DISCONTINUED | OUTPATIENT
Start: 2020-09-16 | End: 2020-09-16

## 2020-09-16 RX ORDER — SODIUM CHLORIDE, SODIUM LACTATE, POTASSIUM CHLORIDE, CALCIUM CHLORIDE 600; 310; 30; 20 MG/100ML; MG/100ML; MG/100ML; MG/100ML
INJECTION, SOLUTION INTRAVENOUS CONTINUOUS PRN
Status: DISCONTINUED | OUTPATIENT
Start: 2020-09-16 | End: 2020-09-16

## 2020-09-16 RX ORDER — SIMETHICONE 80 MG
80 TABLET,CHEWABLE ORAL 4 TIMES DAILY PRN
Status: DISCONTINUED | OUTPATIENT
Start: 2020-09-16 | End: 2020-09-18 | Stop reason: HOSPADM

## 2020-09-16 RX ORDER — ACETAMINOPHEN 325 MG/1
650 TABLET ORAL EVERY 6 HOURS
Status: DISCONTINUED | OUTPATIENT
Start: 2020-09-16 | End: 2020-09-18 | Stop reason: HOSPADM

## 2020-09-16 RX ORDER — ALBUTEROL SULFATE 0.83 MG/ML
2.5 SOLUTION RESPIRATORY (INHALATION) EVERY 4 HOURS PRN
Status: DISCONTINUED | OUTPATIENT
Start: 2020-09-16 | End: 2020-09-16 | Stop reason: HOSPADM

## 2020-09-16 RX ORDER — IPRATROPIUM BROMIDE AND ALBUTEROL SULFATE 2.5; .5 MG/3ML; MG/3ML
3 SOLUTION RESPIRATORY (INHALATION)
Status: DISCONTINUED | OUTPATIENT
Start: 2020-09-16 | End: 2020-09-16

## 2020-09-16 RX ORDER — AMOXICILLIN 250 MG
1 CAPSULE ORAL 2 TIMES DAILY PRN
Status: DISCONTINUED | OUTPATIENT
Start: 2020-09-16 | End: 2020-09-18 | Stop reason: HOSPADM

## 2020-09-16 RX ORDER — MEPERIDINE HYDROCHLORIDE 25 MG/ML
12.5 INJECTION INTRAMUSCULAR; INTRAVENOUS; SUBCUTANEOUS
Status: DISCONTINUED | OUTPATIENT
Start: 2020-09-16 | End: 2020-09-16 | Stop reason: HOSPADM

## 2020-09-16 RX ORDER — BISACODYL 10 MG
10 SUPPOSITORY, RECTAL RECTAL DAILY
Status: DISCONTINUED | OUTPATIENT
Start: 2020-09-16 | End: 2020-09-18 | Stop reason: HOSPADM

## 2020-09-16 RX ORDER — FLUMAZENIL 0.1 MG/ML
0.2 INJECTION, SOLUTION INTRAVENOUS
Status: DISCONTINUED | OUTPATIENT
Start: 2020-09-16 | End: 2020-09-16 | Stop reason: HOSPADM

## 2020-09-16 RX ORDER — FAMOTIDINE 40 MG/1
40 TABLET, FILM COATED ORAL
COMMUNITY
Start: 2020-03-10

## 2020-09-16 RX ORDER — DIPHENHYDRAMINE HYDROCHLORIDE 50 MG/ML
25 INJECTION INTRAMUSCULAR; INTRAVENOUS EVERY 6 HOURS PRN
Status: DISCONTINUED | OUTPATIENT
Start: 2020-09-16 | End: 2020-09-18 | Stop reason: HOSPADM

## 2020-09-16 RX ORDER — ONDANSETRON 4 MG/1
4 TABLET, ORALLY DISINTEGRATING ORAL EVERY 8 HOURS PRN
Status: DISCONTINUED | OUTPATIENT
Start: 2020-09-17 | End: 2020-09-18 | Stop reason: HOSPADM

## 2020-09-16 RX ORDER — TRAZODONE HYDROCHLORIDE 150 MG/1
150 TABLET ORAL AT BEDTIME
Status: DISCONTINUED | OUTPATIENT
Start: 2020-09-16 | End: 2020-09-18 | Stop reason: HOSPADM

## 2020-09-16 RX ORDER — LIDOCAINE 40 MG/G
CREAM TOPICAL
Status: DISCONTINUED | OUTPATIENT
Start: 2020-09-16 | End: 2020-09-18 | Stop reason: HOSPADM

## 2020-09-16 RX ORDER — ONDANSETRON 2 MG/ML
4 INJECTION INTRAMUSCULAR; INTRAVENOUS EVERY 30 MIN PRN
Status: DISCONTINUED | OUTPATIENT
Start: 2020-09-16 | End: 2020-09-16 | Stop reason: HOSPADM

## 2020-09-16 RX ORDER — NALOXONE HYDROCHLORIDE 0.4 MG/ML
.1-.4 INJECTION, SOLUTION INTRAMUSCULAR; INTRAVENOUS; SUBCUTANEOUS
Status: DISCONTINUED | OUTPATIENT
Start: 2020-09-16 | End: 2020-09-18 | Stop reason: HOSPADM

## 2020-09-16 RX ORDER — FENTANYL CITRATE 50 UG/ML
25-50 INJECTION, SOLUTION INTRAMUSCULAR; INTRAVENOUS
Status: DISCONTINUED | OUTPATIENT
Start: 2020-09-16 | End: 2020-09-16 | Stop reason: HOSPADM

## 2020-09-16 RX ORDER — MAGNESIUM HYDROXIDE 1200 MG/15ML
LIQUID ORAL PRN
Status: DISCONTINUED | OUTPATIENT
Start: 2020-09-16 | End: 2020-09-16 | Stop reason: HOSPADM

## 2020-09-16 RX ORDER — HYDROMORPHONE HCL/0.9% NACL/PF 0.2MG/0.2
0.2 SYRINGE (ML) INTRAVENOUS
Status: DISCONTINUED | OUTPATIENT
Start: 2020-09-16 | End: 2020-09-17

## 2020-09-16 RX ORDER — OLANZAPINE 5 MG/1
5 TABLET ORAL AT BEDTIME
Status: DISCONTINUED | OUTPATIENT
Start: 2020-09-16 | End: 2020-09-18 | Stop reason: HOSPADM

## 2020-09-16 RX ORDER — DIPHENHYDRAMINE HCL 25 MG
25 CAPSULE ORAL EVERY 6 HOURS PRN
Status: DISCONTINUED | OUTPATIENT
Start: 2020-09-16 | End: 2020-09-18 | Stop reason: HOSPADM

## 2020-09-16 RX ORDER — IPRATROPIUM BROMIDE AND ALBUTEROL SULFATE 2.5; .5 MG/3ML; MG/3ML
3 SOLUTION RESPIRATORY (INHALATION) ONCE
Status: COMPLETED | OUTPATIENT
Start: 2020-09-16 | End: 2020-09-16

## 2020-09-16 RX ORDER — LIDOCAINE 40 MG/G
CREAM TOPICAL
Status: DISCONTINUED | OUTPATIENT
Start: 2020-09-16 | End: 2020-09-16 | Stop reason: HOSPADM

## 2020-09-16 RX ORDER — ONDANSETRON 2 MG/ML
INJECTION INTRAMUSCULAR; INTRAVENOUS PRN
Status: DISCONTINUED | OUTPATIENT
Start: 2020-09-16 | End: 2020-09-16

## 2020-09-16 RX ORDER — KETAMINE HYDROCHLORIDE 10 MG/ML
INJECTION INTRAMUSCULAR; INTRAVENOUS PRN
Status: DISCONTINUED | OUTPATIENT
Start: 2020-09-16 | End: 2020-09-16

## 2020-09-16 RX ORDER — CEFAZOLIN SODIUM 2 G/100ML
2 INJECTION, SOLUTION INTRAVENOUS
Status: COMPLETED | OUTPATIENT
Start: 2020-09-16 | End: 2020-09-16

## 2020-09-16 RX ORDER — HYDROMORPHONE HYDROCHLORIDE 1 MG/ML
.3-.5 INJECTION, SOLUTION INTRAMUSCULAR; INTRAVENOUS; SUBCUTANEOUS EVERY 10 MIN PRN
Status: DISCONTINUED | OUTPATIENT
Start: 2020-09-16 | End: 2020-09-16 | Stop reason: HOSPADM

## 2020-09-16 RX ORDER — RISPERIDONE 2 MG/1
2 TABLET ORAL AT BEDTIME
Status: DISCONTINUED | OUTPATIENT
Start: 2020-09-16 | End: 2020-09-18 | Stop reason: HOSPADM

## 2020-09-16 RX ORDER — OXYCODONE HYDROCHLORIDE 5 MG/1
5-10 TABLET ORAL
Status: DISCONTINUED | OUTPATIENT
Start: 2020-09-16 | End: 2020-09-18 | Stop reason: HOSPADM

## 2020-09-16 RX ORDER — DEXAMETHASONE SODIUM PHOSPHATE 4 MG/ML
INJECTION, SOLUTION INTRA-ARTICULAR; INTRALESIONAL; INTRAMUSCULAR; INTRAVENOUS; SOFT TISSUE PRN
Status: DISCONTINUED | OUTPATIENT
Start: 2020-09-16 | End: 2020-09-16

## 2020-09-16 RX ORDER — BUPIVACAINE HYDROCHLORIDE 2.5 MG/ML
INJECTION, SOLUTION EPIDURAL; INFILTRATION; INTRACAUDAL PRN
Status: DISCONTINUED | OUTPATIENT
Start: 2020-09-16 | End: 2020-09-16

## 2020-09-16 RX ORDER — PHENAZOPYRIDINE HYDROCHLORIDE 200 MG/1
200 TABLET, FILM COATED ORAL ONCE
Status: COMPLETED | OUTPATIENT
Start: 2020-09-16 | End: 2020-09-16

## 2020-09-16 RX ORDER — CEFAZOLIN SODIUM 1 G/3ML
1 INJECTION, POWDER, FOR SOLUTION INTRAMUSCULAR; INTRAVENOUS SEE ADMIN INSTRUCTIONS
Status: DISCONTINUED | OUTPATIENT
Start: 2020-09-16 | End: 2020-09-16 | Stop reason: HOSPADM

## 2020-09-16 RX ORDER — FAMOTIDINE 20 MG/1
40 TABLET, FILM COATED ORAL DAILY
Status: DISCONTINUED | OUTPATIENT
Start: 2020-09-17 | End: 2020-09-18 | Stop reason: HOSPADM

## 2020-09-16 RX ADMIN — TRAZODONE HYDROCHLORIDE 150 MG: 150 TABLET ORAL at 21:45

## 2020-09-16 RX ADMIN — ROCURONIUM BROMIDE 10 MG: 10 INJECTION INTRAVENOUS at 15:44

## 2020-09-16 RX ADMIN — SODIUM CHLORIDE, POTASSIUM CHLORIDE, SODIUM LACTATE AND CALCIUM CHLORIDE: 600; 310; 30; 20 INJECTION, SOLUTION INTRAVENOUS at 18:52

## 2020-09-16 RX ADMIN — OLANZAPINE 5 MG: 5 TABLET, FILM COATED ORAL at 21:46

## 2020-09-16 RX ADMIN — SUGAMMADEX 200 MG: 100 INJECTION, SOLUTION INTRAVENOUS at 16:00

## 2020-09-16 RX ADMIN — PHENYLEPHRINE HYDROCHLORIDE 50 MCG: 10 INJECTION INTRAVENOUS at 14:21

## 2020-09-16 RX ADMIN — ROCURONIUM BROMIDE 50 MG: 10 INJECTION INTRAVENOUS at 13:30

## 2020-09-16 RX ADMIN — SODIUM CHLORIDE, POTASSIUM CHLORIDE, SODIUM LACTATE AND CALCIUM CHLORIDE: 600; 310; 30; 20 INJECTION, SOLUTION INTRAVENOUS at 13:26

## 2020-09-16 RX ADMIN — FENTANYL CITRATE 50 MCG: 50 INJECTION, SOLUTION INTRAMUSCULAR; INTRAVENOUS at 14:49

## 2020-09-16 RX ADMIN — LIDOCAINE HYDROCHLORIDE 100 MG: 20 INJECTION, SOLUTION INFILTRATION; PERINEURAL at 16:13

## 2020-09-16 RX ADMIN — SODIUM CHLORIDE, POTASSIUM CHLORIDE, SODIUM LACTATE AND CALCIUM CHLORIDE: 600; 310; 30; 20 INJECTION, SOLUTION INTRAVENOUS at 13:45

## 2020-09-16 RX ADMIN — IPRATROPIUM BROMIDE AND ALBUTEROL SULFATE 3 ML: .5; 3 SOLUTION RESPIRATORY (INHALATION) at 17:20

## 2020-09-16 RX ADMIN — FENTANYL CITRATE 25 MCG: 50 INJECTION, SOLUTION INTRAMUSCULAR; INTRAVENOUS at 16:56

## 2020-09-16 RX ADMIN — FENTANYL CITRATE 50 MCG: 50 INJECTION INTRAMUSCULAR; INTRAVENOUS at 10:45

## 2020-09-16 RX ADMIN — PROPOFOL 30 MG: 10 INJECTION, EMULSION INTRAVENOUS at 15:54

## 2020-09-16 RX ADMIN — MAGNESIUM HYDROXIDE 15 ML: 400 SUSPENSION ORAL at 21:46

## 2020-09-16 RX ADMIN — PROPOFOL 30 MG: 10 INJECTION, EMULSION INTRAVENOUS at 16:00

## 2020-09-16 RX ADMIN — FENTANYL CITRATE 50 MCG: 50 INJECTION, SOLUTION INTRAMUSCULAR; INTRAVENOUS at 16:09

## 2020-09-16 RX ADMIN — DEXAMETHASONE SODIUM PHOSPHATE 4 MG: 4 INJECTION, SOLUTION INTRA-ARTICULAR; INTRALESIONAL; INTRAMUSCULAR; INTRAVENOUS; SOFT TISSUE at 14:42

## 2020-09-16 RX ADMIN — ALBUTEROL SULFATE 6 PUFF: 108 INHALANT RESPIRATORY (INHALATION) at 15:20

## 2020-09-16 RX ADMIN — MIDAZOLAM 1 MG: 1 INJECTION INTRAMUSCULAR; INTRAVENOUS at 10:45

## 2020-09-16 RX ADMIN — FENTANYL CITRATE 50 MCG: 50 INJECTION, SOLUTION INTRAMUSCULAR; INTRAVENOUS at 15:43

## 2020-09-16 RX ADMIN — ROCURONIUM BROMIDE 20 MG: 10 INJECTION INTRAVENOUS at 14:44

## 2020-09-16 RX ADMIN — ONDANSETRON 4 MG: 2 INJECTION INTRAMUSCULAR; INTRAVENOUS at 15:43

## 2020-09-16 RX ADMIN — BUPIVACAINE HYDROCHLORIDE 40 ML: 2.5 INJECTION, SOLUTION EPIDURAL; INFILTRATION; INTRACAUDAL; PERINEURAL at 10:50

## 2020-09-16 RX ADMIN — LIDOCAINE HYDROCHLORIDE 100 MG: 20 INJECTION, SOLUTION INFILTRATION; PERINEURAL at 13:30

## 2020-09-16 RX ADMIN — GLYCOPYRROLATE 0.2 MG: 0.2 INJECTION, SOLUTION INTRAMUSCULAR; INTRAVENOUS at 15:11

## 2020-09-16 RX ADMIN — SODIUM CHLORIDE, POTASSIUM CHLORIDE, SODIUM LACTATE AND CALCIUM CHLORIDE: 600; 310; 30; 20 INJECTION, SOLUTION INTRAVENOUS at 16:55

## 2020-09-16 RX ADMIN — DEXAMETHASONE SODIUM PHOSPHATE 6 MG: 4 INJECTION, SOLUTION INTRA-ARTICULAR; INTRALESIONAL; INTRAMUSCULAR; INTRAVENOUS; SOFT TISSUE at 15:55

## 2020-09-16 RX ADMIN — FENTANYL CITRATE 25 MCG: 50 INJECTION, SOLUTION INTRAMUSCULAR; INTRAVENOUS at 17:08

## 2020-09-16 RX ADMIN — Medication 2 G: at 14:10

## 2020-09-16 RX ADMIN — PROPOFOL 150 MG: 10 INJECTION, EMULSION INTRAVENOUS at 13:30

## 2020-09-16 RX ADMIN — FENTANYL CITRATE 100 MCG: 50 INJECTION, SOLUTION INTRAMUSCULAR; INTRAVENOUS at 13:30

## 2020-09-16 RX ADMIN — RISPERIDONE 2 MG: 2 TABLET ORAL at 21:46

## 2020-09-16 RX ADMIN — BUPIVACAINE 20 ML: 13.3 INJECTION, SUSPENSION, LIPOSOMAL INFILTRATION at 10:50

## 2020-09-16 RX ADMIN — Medication 30 MG: at 15:28

## 2020-09-16 RX ADMIN — ROCURONIUM BROMIDE 20 MG: 10 INJECTION INTRAVENOUS at 15:25

## 2020-09-16 RX ADMIN — PHENAZOPYRIDINE HYDROCHLORIDE 200 MG: 200 TABLET ORAL at 09:36

## 2020-09-16 ASSESSMENT — MIFFLIN-ST. JEOR: SCORE: 1816.75

## 2020-09-16 ASSESSMENT — ACTIVITIES OF DAILY LIVING (ADL): ADLS_ACUITY_SCORE: 13

## 2020-09-16 NOTE — ANESTHESIA POSTPROCEDURE EVALUATION
Anesthesia POST Procedure Evaluation    Patient: Sadi Bedoya   MRN:     [id number] Gender:   female   Age:    45 year old :      1975        Preoperative Diagnosis: Adnexal mass [N94.89]   Procedure(s):  Exploratory laparotomy, right salpingo-oophorectomy, appendectomy, staging biopsies, omentectomy   Postop Comments: No value filed.     Anesthesia Type: General, Peripheral Nerve Block       Disposition: Admission   Postop Pain Control: Uneventful            Sign Out: Well controlled pain   PONV: No   Neuro/Psych: Uneventful            Sign Out: Acceptable/Baseline neuro status   Airway/Respiratory: Uneventful            Sign Out: Acceptable/Baseline resp. status   CV/Hemodynamics: Uneventful            Sign Out: Acceptable CV status   Other NRE: NONE   DID A NON-ROUTINE EVENT OCCUR? No         Last Anesthesia Record Vitals:  CRNA VITALS  2020 1552 - 2020 1652      2020             Pulse:  89    SpO2:  100 %          Last PACU Vitals:  Vitals Value Taken Time   /73 2020  5:57 PM   Temp 35.8  C (96.5  F) 2020  5:57 PM   Pulse 72 2020  5:57 PM   Resp 16 2020  5:57 PM   SpO2 100 % 2020  5:57 PM   Temp src     NIBP     Pulse     SpO2     Resp     Temp     Ht Rate     Temp 2           Electronically Signed By: Buddy Cordova MD, 2020, 6:07 PM

## 2020-09-16 NOTE — DISCHARGE SUMMARY
Gynecologic Oncology Discharge Summary    Sadi Bedoya  [id number]    Admit Date: 9/16/2020  Discharge Date: 9/18/2020  Admitting Provider: Aisha Solis MD   Discharge Provider: Clare Kahn MD    Admission Dx:   - Pelvic mass  - GERD  - Schizoaffective disorder     Discharge Dx:  - Pelvic mass, mucinous at least borderline on frozen pathology   - Otherwise same     Patient Active Problem List   Diagnosis     CARDIOVASCULAR SCREENING; LDL GOAL LESS THAN 160     Female infertility of other specified origin     Varicose veins of both lower extremities     Schizoaffective disorder, bipolar type (H)     Adnexal mass     Anemia, unspecified     Boutonniere deformity of finger     Cultural disadvantagement     Dysuria     Encounter for long-term current use of high risk medication     Iron deficiency     Malaria     Obesity     Other insomnia     Pain in both knees     Spondylisthesis     Varicose veins of both lower extremities with pain     Vitamin D deficiency     S/P laparotomy       Procedures: XL RSO, appendectomy, omentectomy, staging biopsies    Prior to Admission Medications:  Medications Prior to Admission   Medication Sig Dispense Refill Last Dose     OLANZapine (ZYPREXA) 5 MG tablet Take 5 mg by mouth At Bedtime    9/15/2020 at 2200     Prenatal Vit-Fe Fumarate-FA (PRENATAL MULTIVITAMIN PLUS IRON) 27-0.8 MG TABS per tablet Take 1 tablet by mouth every morning    Past Month at Unknown time     risperiDONE (RISPERDAL) 2 MG tablet Take 2 mg by mouth At Bedtime    9/15/2020 at 2200     traZODone (DESYREL) 150 MG tablet 150 mg by Oral or Feeding Tube route as needed   9/15/2020 at 2200     vitamin D (ERGOCALCIFEROL) 38103 UNIT capsule Take 50,000 Units by mouth every morning    Past Week at Unknown time     Vitamin D, Cholecalciferol, 25 MCG (1000 UT) CAPS Take by mouth every morning   Past Week at Unknown time     famotidine (PEPCID) 40 MG tablet Take 40 mg by mouth   More than a month at Unknown time      order for DME Equipment being ordered: compression stockings knee high moderate compression 2 each 1      order for DME Equipment being ordered: shoes with arch support and heal cushioning 1 each 0      [DISCONTINUED] Acetaminophen (TYLENOL PO) Take by mouth as needed    9/15/2020 at 2200       Discharge Medications:     Review of your medicines      START taking      Dose / Directions   ibuprofen 400 MG tablet  Commonly known as:  ADVIL/MOTRIN      Dose:  400 mg  Take 1 tablet (400 mg) by mouth every 6 hours as needed for moderate pain  Quantity:  90 tablet  Refills:  0     oxyCODONE 5 MG tablet  Commonly known as:  ROXICODONE      Dose:  5 mg  Take 1 tablet (5 mg) by mouth every 6 hours as needed for breakthrough pain  Quantity:  5 tablet  Refills:  0     senna-docusate 8.6-50 MG tablet  Commonly known as:  SENOKOT-S/PERICOLACE      Dose:  1-2 tablet  Take 1-2 tablets by mouth 2 times daily as needed for constipation  Quantity:  60 tablet  Refills:  0        CONTINUE these medicines which may have CHANGED, or have new prescriptions. If we are uncertain of the size of tablets/capsules you have at home, strength may be listed as something that might have changed.      Dose / Directions   acetaminophen 325 MG tablet  Commonly known as:  TYLENOL  This may have changed:      medication strength    how much to take    when to take this    reasons to take this      Dose:  650 mg  Take 2 tablets (650 mg) by mouth every 6 hours as needed for mild pain  Quantity:  1 Bottle  Refills:  0        CONTINUE these medicines which have NOT CHANGED      Dose / Directions   famotidine 40 MG tablet  Commonly known as:  PEPCID      Dose:  40 mg  Take 40 mg by mouth  Refills:  0     OLANZapine 5 MG tablet  Commonly known as:  zyPREXA      Dose:  5 mg  Take 5 mg by mouth At Bedtime  Refills:  0     * order for DME  Used for:  Peripheral edema      Equipment being ordered: compression stockings knee high moderate  compression  Quantity:  2 each  Refills:  1     * order for DME  Used for:  Bilateral leg pain, Plantar fasciitis      Equipment being ordered: shoes with arch support and heal cushioning  Quantity:  1 each  Refills:  0     prenatal multivitamin w/iron 27-0.8 MG tablet      Dose:  1 tablet  Take 1 tablet by mouth every morning  Refills:  0     risperiDONE 2 MG tablet  Commonly known as:  risperDAL      Dose:  2 mg  Take 2 mg by mouth At Bedtime  Refills:  0     traZODone 150 MG tablet  Commonly known as:  DESYREL      Dose:  150 mg  150 mg by Oral or Feeding Tube route as needed  Refills:  0     Vitamin D (Cholecalciferol) 25 MCG (1000 UT) Caps      Take by mouth every morning  Refills:  0     vitamin D2 39890 units (1250 mcg) capsule  Commonly known as:  ERGOCALCIFEROL      Dose:  50,000 Units  Take 50,000 Units by mouth every morning  Refills:  0         * This list has 2 medication(s) that are the same as other medications prescribed for you. Read the directions carefully, and ask your doctor or other care provider to review them with you.               Where to get your medicines      These medications were sent to [facility] [place], [place] - [address] [place] [id number]    Phone:  [phone]     acetaminophen 325 MG tablet    ibuprofen 400 MG tablet    senna-docusate 8.6-50 MG tablet     Some of these will need a paper prescription and others can be bought over the counter. Ask your nurse if you have questions.    Bring a paper prescription for each of these medications    oxyCODONE 5 MG tablet         Consultations:  None    Brief History of Illness:  Sadi Bedoya is a 35yo (44yo in Epic, true age 34) who presented to clinic with an enlarging pelvic mass. She was recommended to undergo the above surgery with possible cancer staging. She desired fertility sparing surgery regardless of frozen pathology results. The risks of surgery were reviewed and written  consent signed on the morning of surgery.     Hospital Course:  Dz:   - Preoperative diagnosis was pelvic mass.  Frozen section at the time of the surgery showed mucinous, at least borderline.  Final pathology is pending at the time of discharge.  She will follow-up postoperatively for a care plan.  FEN:   - She was maintained on IVF until POD#1, when her diet was advanced.  By discharge, she was tolerating a regular diet without nausea and vomiting and able to maintain her hydration without IVF supplementation.  Pain:   - Her pain was initially controlled on a PO pain regimen.  Her pain was well controlled on this and she was discharged home with these medications.  CV:   - She has no history of CV issues.  Her vital signs were stable while in house and she had no acute CV issues.  PULM:   - She has no history of pulmonary issues.  She was initially given O2 supplementation in to maintain her O2 sats in the immediate postop period and was transitioned off of this without difficulty.  By discharge, her O2 sats were greater than 94% on RA.  She was encouraged to use her bedside IS while in house.  She had no acute pulmonary issues while in house.  HEME:   - Her preoperative Hgb was 11.6.  Her hgb dropped to 11.4 postoperatively and was stable at 11.4 at the time of discharge.  She had no other acute heme issues while in house.  GI:   - She was made NPO prior to the procedure.  On POD#0, her diet was advanced to clear liquids and then advanced slowly as tolerated.  At the time of discharge, she was tolerating a regular diet without nausea and vomiting.  She will be discharged with a bowel regimen to prevent constipation in the postoperative period.  She had no acute GI issues while in house.  :    - A cruz catheter was placed at the time of the surgery.  Once ambulating unassisted, the cruz catheter was removed.  Prior to discharge, the patient was voiding spontaneously without difficulty.  She had no acute   issues while in house.  ID:   - The patient was AF during her hospitalization.  She received standard preoperative antibiotics without incident.    ENDO:   - no issues  PSYCH/NEURO:   - Patient has a history of schizoaffective disorder and was maintained on her PTA medications while hospitalized. She had no other issues.  PPX:    - She was given SCDs, IS, PPI and lovenox during her hospital course.  She tolerated these prophylactic interventions without incident.  They were discontinued at the time of her discharge.      Discharge Instructions and Follow up:  Ms. Sadi Bedoya was discharged from the hospital with follow up for postoperative check.    Discharge Diet: Regular  Discharge Activity: Lifting restricted to 12-15 pounds for 6 weeks, No driving while on narcotics.   Discharge Follow up: Dr. Solis on 10/13    Discharge Disposition:  Discharged to home    Discharge Staff: Dr. Clare Young MD  PGY1

## 2020-09-16 NOTE — BRIEF OP NOTE
[facility], [place]    Brief Operative Note    Pre-operative diagnosis: Adnexal mass [N94.89]  Post-operative diagnosis Same as pre-operative diagnosis; mucinous at least borderline     Procedure: Procedure(s):  Exploratory laparotomy, right salpingo-oophorectomy, appendectomy, staging biopsies, omentectomy  Surgeon: Surgeon(s) and Role:     * Aisha Solis MD - Primary     * Karmen Hernandez MD - Resident - Assisting     * Jen Ramon MD - Fellow - Assisting  Anesthesia: Combined General with Block   Estimated blood loss: 200 ml  Drains: Martins catheter  IVF: 1000cc  UOP: 100cc clear    Specimens:   ID Type Source Tests Collected by Time Destination   1 : pelvic washings  Fluid Other CYTOLOGY NON GYN Aisha Solis MD 9/16/2020  2:20 PM    A : right fallopian tube and ovary  Tissue Fallopian Tube and Ovary, Right SURGICAL PATHOLOGY EXAM Aisha Solis MD 9/16/2020  2:25 PM    B : portion of right fallopian tube Tissue Fallopian Tube, Right SURGICAL PATHOLOGY EXAM Aisha Solis MD 9/16/2020  2:54 PM    C : right pelvic sidewall Tissue Pelvic Sidewall SURGICAL PATHOLOGY EXAM Aisha Solis MD 9/16/2020  3:03 PM    D : bladder peritoneum Tissue Abdomen SURGICAL PATHOLOGY EXAM Aisha Solis MD 9/16/2020  3:05 PM    E : left pelvic sidewall Tissue Pelvic Sidewall SURGICAL PATHOLOGY EXAM Aisha Solis MD 9/16/2020  3:06 PM    F : omentum Tissue Omentum SURGICAL PATHOLOGY EXAM Aisha Solis MD 9/16/2020  3:08 PM    G : left paracolic gutter Tissue Paracolic Gutter, Left SURGICAL PATHOLOGY EXAM Aisha Solis MD 9/16/2020  3:13 PM    H : right paracolic gutter Tissue Paracolic Gutter, Right SURGICAL PATHOLOGY EXAM Aisha Solis MD 9/16/2020  3:13 PM    I : posterior cul de sac biopsy Tissue Abdomen SURGICAL PATHOLOGY EXAM Aisha Solis MD 9/16/2020  3:17 PM    J : jejunum serosa biopsy Tissue Abdomen SURGICAL PATHOLOGY EXAM Aisha Solis MD 9/16/2020  3:18 PM    K :  sigmoid epiploic tag Tissue Abdomen SURGICAL PATHOLOGY EXAM Aisha Solis MD 9/16/2020  3:21 PM    L : appendix Organ Appendix SURGICAL PATHOLOGY EXAM Aisha Solis MD 9/16/2020  3:31 PM      Findings:   Exam under anesthesia shows large, mobile mass extending above the umbilicus. On laparotomy, simple cystic mass arising from the right ovary. Filmy adhesions from the jejunum to the posterior mass. Normal left ovary and fallopian tube. Normal uterus. Right ureter visualized. No other evidence of disease along bowel or peritoneal surfaces. Hemostasis at end of case.   Complications: Intraoperative desaturation, resolved by end of case.    Disposition: to PACU in stable condition    Karmen Hernandez MD

## 2020-09-16 NOTE — PLAN OF CARE
Alert and oriented x 4. Vital signs stable. On room air. On capnography. Denies pain. Intervention/Assessment: Oriented to room and call light. Educated on how to use IS, patient  demonstrated properly. Performed oral care.  Skin care:Admitted/transferred from PACU 2 RN  skin assessment completed by Pamela Saunders RN and Corrie Toth  Skin assessment: Skin is intact except midline abdominal surgical incision which is covered by premapore.  Interventions/actions:Continue standard plan of  care.     Will continue to monitor.

## 2020-09-16 NOTE — PROGRESS NOTES
Gynecologic Oncology Postoperative Check Note  9/16/2020    S: Doing well. Some pain in abdomen, tolerable. Denies cp, SOB, n/v. Would like to try something to eat.     O:  Vitals:    09/16/20 1045 09/16/20 1050 09/16/20 1055 09/16/20 1100   BP: 113/69 116/46 116/63 110/59   BP Location:       Pulse: 74 70 64 66   Resp: 16 16 16 16   Temp:       TempSrc:       SpO2: 100% 100% 100% 100%   Weight:       Height:           Gen: laying in bed, appears well  Cardio: RRR  Resp: CTAB  Abdomen: soft, appropriately tender  Incision: VML with clean bandage  Extremities: soft, SCDs in place     A: 45 year old POD#0 s/p exploratory laparotomy, right salpingo-oophorectomy, appendectomy, staging biopsies, omentectomy. Doing well in immediate post-op period.    Dz: Pelvic mass, mucinous at least borderline on frozen. Discussed pathology with patient and her niece.   FEN: Reg diet, LR 125cc/h   Pain: Tylenol, oxycodone, dilaudid prn   Heme: Hgb 11.6>>AM CBC   CV: NI  Pulm: Intra-op desaturation, extubated without difficulty and now satting >96% on RA   GI: GERD - pepcid   : Martins in place, remove POD#1   Endo: NI  ID: NI  Psych/Neuro/MSK: Schizoaffective - PTA zyprexa, risperdal  Lines/drains: Martins, PIV  PPx: SCDs, plan Lovenox POD#1    Karmen Hernandez MD

## 2020-09-16 NOTE — ANESTHESIA PROCEDURE NOTES
Peripheral Nerve Block Procedure Note      Staff -   Anesthesiologist:  Melissa Metzger MD  Resident/Fellow: Lashae Arredondo MD  Performed By: resident  Procedure performed by resident/CRNA in presence of a teaching physician.    Location: Pre-op  Procedure Start/Stop TImes:      9/16/2020 10:48 AM     9/16/2020 10:53 AM    patient identified, IV checked, site marked, risks and benefits discussed, informed consent, monitors and equipment checked, pre-op evaluation, at physician/surgeon's request and post-op pain management      Correct Patient: Yes      Correct Position: Yes      Correct Site: Yes      Correct Procedure: Yes      Correct Laterality:  Yes    Site Marked:  Yes  Procedure details:     Procedure:  TAP    Diagnosis:  Postoperative pain relief    Laterality:  Bilateral    Position:  Supine    Sterile Prep: chloraprep, mask and sterile gloves      Local skin infiltration:  None    Needle:  Insulated    Needle gauge:  21    Needle length (mm):  110    Ultrasound: Yes      Ultrasound used to identify targeted nerve, plexus, or vascular structure and placed a needle adjacent to it      Permanent Image entered into patiient's record      Abnormal pain on injection: No      Blood Aspirated: No      Paresthesias:  No    Bleeding at site: No      Bolus via:  Needle    Infusion Method:  Single Shot    Complications:  None  Assessment/Narrative:     Injection made incrementally with aspirations every (mL):  5     Discussed risks of nerve block, including nerve injury, bleeding, infection, incomplete analgesia.  Patient tolerated well. Incremental aspiration every 5 mL. No paresthesia, no heme. Needle tip visualized throughout with appropriate spread of local anesthetic in fascial plane. IPad [employer]  present throught block consent and block procedure.

## 2020-09-17 LAB
ANION GAP SERPL CALCULATED.3IONS-SCNC: 5 MMOL/L (ref 3–14)
ANION GAP SERPL CALCULATED.3IONS-SCNC: 6 MMOL/L (ref 3–14)
BUN SERPL-MCNC: 7 MG/DL (ref 7–30)
BUN SERPL-MCNC: 7 MG/DL (ref 7–30)
CALCIUM SERPL-MCNC: 8.2 MG/DL (ref 8.5–10.1)
CALCIUM SERPL-MCNC: 8.2 MG/DL (ref 8.5–10.1)
CHLORIDE SERPL-SCNC: 108 MMOL/L (ref 94–109)
CHLORIDE SERPL-SCNC: 109 MMOL/L (ref 94–109)
CO2 SERPL-SCNC: 24 MMOL/L (ref 20–32)
CO2 SERPL-SCNC: 25 MMOL/L (ref 20–32)
CREAT SERPL-MCNC: 0.64 MG/DL (ref 0.52–1.04)
CREAT SERPL-MCNC: 0.67 MG/DL (ref 0.52–1.04)
ERYTHROCYTE [DISTWIDTH] IN BLOOD BY AUTOMATED COUNT: 12.3 % (ref 10–15)
GFR SERPL CREATININE-BSD FRML MDRD: >90 ML/MIN/{1.73_M2}
GFR SERPL CREATININE-BSD FRML MDRD: >90 ML/MIN/{1.73_M2}
GLUCOSE SERPL-MCNC: 138 MG/DL (ref 70–99)
GLUCOSE SERPL-MCNC: 139 MG/DL (ref 70–99)
HCT VFR BLD AUTO: 35.3 % (ref 35–47)
HGB BLD-MCNC: 11.4 G/DL (ref 11.7–15.7)
MCH RBC QN AUTO: 30.6 PG (ref 26.5–33)
MCHC RBC AUTO-ENTMCNC: 32.3 G/DL (ref 31.5–36.5)
MCV RBC AUTO: 95 FL (ref 78–100)
PLATELET # BLD AUTO: 368 10E9/L (ref 150–450)
POTASSIUM SERPL-SCNC: 3.8 MMOL/L (ref 3.4–5.3)
POTASSIUM SERPL-SCNC: 3.8 MMOL/L (ref 3.4–5.3)
RBC # BLD AUTO: 3.72 10E12/L (ref 3.8–5.2)
SODIUM SERPL-SCNC: 138 MMOL/L (ref 133–144)
SODIUM SERPL-SCNC: 139 MMOL/L (ref 133–144)
WBC # BLD AUTO: 9.8 10E9/L (ref 4–11)

## 2020-09-17 PROCEDURE — 80048 BASIC METABOLIC PNL TOTAL CA: CPT | Performed by: OBSTETRICS & GYNECOLOGY

## 2020-09-17 PROCEDURE — 99024 POSTOP FOLLOW-UP VISIT: CPT | Performed by: OBSTETRICS & GYNECOLOGY

## 2020-09-17 PROCEDURE — 85027 COMPLETE CBC AUTOMATED: CPT | Performed by: STUDENT IN AN ORGANIZED HEALTH CARE EDUCATION/TRAINING PROGRAM

## 2020-09-17 PROCEDURE — 12000001 ZZH R&B MED SURG/OB UMMC

## 2020-09-17 PROCEDURE — 25000128 H RX IP 250 OP 636: Performed by: OBSTETRICS & GYNECOLOGY

## 2020-09-17 PROCEDURE — 80048 BASIC METABOLIC PNL TOTAL CA: CPT | Performed by: STUDENT IN AN ORGANIZED HEALTH CARE EDUCATION/TRAINING PROGRAM

## 2020-09-17 PROCEDURE — 36415 COLL VENOUS BLD VENIPUNCTURE: CPT | Performed by: STUDENT IN AN ORGANIZED HEALTH CARE EDUCATION/TRAINING PROGRAM

## 2020-09-17 PROCEDURE — 25000132 ZZH RX MED GY IP 250 OP 250 PS 637: Performed by: OBSTETRICS & GYNECOLOGY

## 2020-09-17 PROCEDURE — 25000132 ZZH RX MED GY IP 250 OP 250 PS 637: Performed by: STUDENT IN AN ORGANIZED HEALTH CARE EDUCATION/TRAINING PROGRAM

## 2020-09-17 PROCEDURE — 36415 COLL VENOUS BLD VENIPUNCTURE: CPT | Performed by: OBSTETRICS & GYNECOLOGY

## 2020-09-17 PROCEDURE — 25800030 ZZH RX IP 258 OP 636: Performed by: OBSTETRICS & GYNECOLOGY

## 2020-09-17 RX ORDER — IBUPROFEN 600 MG/1
600 TABLET, FILM COATED ORAL EVERY 6 HOURS
Status: DISCONTINUED | OUTPATIENT
Start: 2020-09-17 | End: 2020-09-18 | Stop reason: HOSPADM

## 2020-09-17 RX ADMIN — ONDANSETRON 4 MG: 4 TABLET, ORALLY DISINTEGRATING ORAL at 09:45

## 2020-09-17 RX ADMIN — IBUPROFEN 600 MG: 600 TABLET, FILM COATED ORAL at 11:31

## 2020-09-17 RX ADMIN — ACETAMINOPHEN 650 MG: 325 TABLET, FILM COATED ORAL at 00:30

## 2020-09-17 RX ADMIN — IBUPROFEN 600 MG: 600 TABLET, FILM COATED ORAL at 17:40

## 2020-09-17 RX ADMIN — MAGNESIUM HYDROXIDE 15 ML: 400 SUSPENSION ORAL at 09:45

## 2020-09-17 RX ADMIN — BISACODYL 10 MG: 10 SUPPOSITORY RECTAL at 09:52

## 2020-09-17 RX ADMIN — ACETAMINOPHEN 650 MG: 325 TABLET, FILM COATED ORAL at 09:49

## 2020-09-17 RX ADMIN — SODIUM CHLORIDE, POTASSIUM CHLORIDE, SODIUM LACTATE AND CALCIUM CHLORIDE: 600; 310; 30; 20 INJECTION, SOLUTION INTRAVENOUS at 01:07

## 2020-09-17 RX ADMIN — FAMOTIDINE 40 MG: 20 TABLET ORAL at 09:49

## 2020-09-17 ASSESSMENT — ACTIVITIES OF DAILY LIVING (ADL)
ADLS_ACUITY_SCORE: 13

## 2020-09-17 NOTE — PLAN OF CARE
Alert and oriented x 4.Vital signs stable. On room air. Abdominal incision pain fully managed with oral pain medications. Good oral intake. Ambulates in hallways with stand by assist. Martins removed at 1200. Due to void. Had Dulcolax suppository and no BM yet. Resting on bedside chair. Call light within reach. Plan:continue care.

## 2020-09-17 NOTE — PROGRESS NOTES
"Gynecology Oncology Progress Note  9/17/2020    Sadi Bedoya is a 45 year old POD#1 s/p XL, RSO, appendectomy, staging biopsies.     Dz: Pelvic mass. Mucinous borderline tumor on frozen section    24 hour events:   - procedure as above  - no other acute events    Subjective: Feeling okay this morning. Pain well controlled. Tolerating small amounts of PO intake without nausea or vomiting. No flatus, no bowel movement. Martins in place. Has not been out of bed yet. Denies fevers, chills, chest pain, SOB. No other questions or concerns.    Objective:   Patient Vitals for the past 24 hrs:   BP Temp Temp src Pulse Resp SpO2 Height Weight   09/17/20 0200 98/50 98.3  F (36.8  C) Oral 77 17 94 % -- --   09/16/20 2230 112/51 97.6  F (36.4  C) Oral 87 16 96 % -- --   09/16/20 2155 132/65 -- -- 88 20 93 % -- --   09/16/20 2055 118/53 -- -- 76 14 95 % -- --   09/16/20 1955 125/66 -- -- 79 15 94 % -- --   09/16/20 1925 131/79 -- -- 79 15 95 % -- --   09/16/20 1855 119/63 -- -- 75 16 94 % -- --   09/16/20 1825 135/64 -- -- 73 14 94 % -- --   09/16/20 1810 128/74 -- -- 73 16 94 % -- --   09/16/20 1757 117/73 96.5  F (35.8  C) Oral 72 16 100 % -- --   09/16/20 1730 124/74 98  F (36.7  C) Axillary 70 8 96 % -- --   09/16/20 1715 125/80 -- -- 76 14 96 % -- --   09/16/20 1700 128/79 -- -- 66 13 100 % -- --   09/16/20 1645 128/67 -- -- 65 14 100 % -- --   09/16/20 1630 (!) 144/99 97.7  F (36.5  C) Axillary 75 17 100 % -- --   09/16/20 1100 110/59 -- -- 66 16 100 % -- --   09/16/20 1055 116/63 -- -- 64 16 100 % -- --   09/16/20 1050 116/46 -- -- 70 16 100 % -- --   09/16/20 1045 113/69 -- -- 74 16 100 % -- --   09/16/20 1042 113/63 -- -- 56 16 100 % -- --   09/16/20 0847 105/76 98.1  F (36.7  C) Oral 76 16 99 % 1.702 m (5' 7.01\") 113.4 kg (250 lb)       General: in NAD  CV: RRR  Resp: faint crackles in bilateral bases, no increased work of breathing on room air  Abdomen: soft, appropriately tender, non distended, abdominal binder in " place  Incision: c/d/i, dressing in place with no shadowing  Extremities: nontender, no edema, SCDs in place    I/Os  (Yesterday // Since Midnight)  IV: 1500 mL // 952 mL  PO: 240 mL // 0 mL  Urine 830 mL // 1350 mL    Labs/Imaging:  Results for orders placed or performed during the hospital encounter of 09/16/20 (from the past 24 hour(s))   POC US Guidance Needle Placement    Impression    Four quadrant TAP block    HCG qualitative urine   Result Value Ref Range    HCG Qual Urine Negative NEG^Negative   Glucose by meter   Result Value Ref Range    Glucose 84 70 - 99 mg/dL   Surgical pathology exam   Result Value Ref Range    Copath Report       Patient Name: KADEEM NICHOLAS  MR#: [id number]  Specimen #: [id number]  Reported: 9/16/2020 17:02  Ordering Phy(s): ARIS GILBERT    +++PRELIMINARY+++  INTRAOPERATIVE DIAGNOSIS  PRELIMINARY INTRAOPERATIVE FROZEN SECTION DIAGNOSIS:  A. Ovary and fallopian tube, right:  - Borderline tumor with mucinous features    ELECTRONICALLY SIGNED OUT BY:  Lalo Rodriguez M.D., [facility]    Status:  Signed Out  Date Ordered: 9/16/2020 14:33  Date Reported: 9/16/2020 17:02       Basic metabolic panel   Result Value Ref Range    Sodium 139 133 - 144 mmol/L    Potassium 3.8 3.4 - 5.3 mmol/L    Chloride 108 94 - 109 mmol/L    Carbon Dioxide 25 20 - 32 mmol/L    Anion Gap 6 3 - 14 mmol/L    Glucose 138 (H) 70 - 99 mg/dL    Urea Nitrogen 7 7 - 30 mg/dL    Creatinine 0.64 0.52 - 1.04 mg/dL    GFR Estimate >90 >60 mL/min/[1.73_m2]    GFR Estimate If Black >90 >60 mL/min/[1.73_m2]    Calcium 8.2 (L) 8.5 - 10.1 mg/dL   CBC with platelets   Result Value Ref Range    WBC 9.8 4.0 - 11.0 10e9/L    RBC Count 3.72 (L) 3.8 - 5.2 10e12/L    Hemoglobin 11.4 (L) 11.7 - 15.7 g/dL    Hematocrit 35.3 35.0 - 47.0 %    MCV 95 78 - 100 fl    MCH 30.6 26.5 - 33.0 pg    MCHC 32.3 31.5 - 36.5 g/dL    RDW 12.3 10.0 - 15.0 %    Platelet Count 368 150 - 450 10e9/L   Basic metabolic panel   Result Value Ref Range     Sodium 138 133 - 144 mmol/L    Potassium 3.8 3.4 - 5.3 mmol/L    Chloride 109 94 - 109 mmol/L    Carbon Dioxide 24 20 - 32 mmol/L    Anion Gap 5 3 - 14 mmol/L    Glucose 139 (H) 70 - 99 mg/dL    Urea Nitrogen 7 7 - 30 mg/dL    Creatinine 0.67 0.52 - 1.04 mg/dL    GFR Estimate >90 >60 mL/min/[1.73_m2]    GFR Estimate If Black >90 >60 mL/min/[1.73_m2]    Calcium 8.2 (L) 8.5 - 10.1 mg/dL      Assessment: 34 y.o. POD#1 s/p exploratory laparotomy, right salpingo-oophorectomy, appendectomy, staging biopsies, omentectomy. Doing well in the immediate post operative period.    Plan:    Dz: Pelvic mass. Mucinous at least borderline tumor on frozen. Final pathology pending.     # Post-operative state  - Pain: controlled. Continue scheduled Tylenol, PRN oxycodone. Will add scheduled ibuprofen today. Discontinue PRN IV dilaudid.   - FEN: Regular diet. Discontinued maintenance IVF.   - Appropriate surgical blood loss. No s/s of anemia. UOP adequate. VSS.  - Plan for cruz removal and trial of void today.   - Encourage IS. Ambulate at least 4x daily.   - Continue bowel regimen. PRN antiemetics.     # Schizoaffective Disorder  - Continue PTA Zyprexa, Risperdal, Trazodone     # GERD  - Continue PTA Pepcid    Ppx: SCDs, IS, Lovenox  Lines/Drains: PIV, cruz  Dispo: pending post-op goals    Keshia Young MD  PGY1    I saw and evaluated the patient. I agree with the findings and the plan of care as documented in Dr. Young 's note.     Clare Kahn MD  Gynecologic Oncology  Pager [phone]

## 2020-09-17 NOTE — PLAN OF CARE
"[id number]    BP 98/50 (BP Location: Left arm)   Pulse 77   Temp 98.3  F (36.8  C) (Oral)   Resp 17   Ht 1.702 m (5' 7.01\")   Wt 113.4 kg (250 lb)   LMP 09/04/2020 (Approximate)   SpO2 94%   BMI 39.15 kg/m      Reason for admission: POD1: Exploratory laparotomy, right salpingo-oophorectomy, appendectomy, staging biopsies, omentectomy  Activity: Independent at baseline. Not OOB post op, pt refusing dangle and stand at bedside, reporting will get OOB today.   Pain: Pt reporting incisional pain. Refused scheduled Tylenol x1, given x1, declining PRN pain medications.  Neuro: AxOx4. Neuros intact. [place]ian speaking. Sulema at bedside for assistance with interpreting at start of shift. Pt able to communicate needs in english.   Cardiac: WDL. VSS.   Respiratory: Non labored breathing on RA. Capno intact, WDL.   GI/: Abdomen obese, soft, diffusely tender. +BS, no flatus, no BM. Martins catheter with 2050cc UOP from [id number]. Denies nausea, declining scheduled Zofran.   Diet: Regular diet. Food brought in by niece, good appetite.   Lines: PIV x2 intact, SLx1, LR at 125 x1. Sites WDL.   Wounds: Abdominal dressing CDI without drainage. Abdominal binder in place.   Labs/imaging: Reviewed. See chart.       Continue to monitor and follow POC      "

## 2020-09-18 VITALS
RESPIRATION RATE: 18 BRPM | WEIGHT: 250 LBS | SYSTOLIC BLOOD PRESSURE: 99 MMHG | HEIGHT: 67 IN | OXYGEN SATURATION: 99 % | HEART RATE: 70 BPM | DIASTOLIC BLOOD PRESSURE: 58 MMHG | BODY MASS INDEX: 39.24 KG/M2 | TEMPERATURE: 98.1 F

## 2020-09-18 LAB — COPATH REPORT: NORMAL

## 2020-09-18 PROCEDURE — 25000132 ZZH RX MED GY IP 250 OP 250 PS 637: Performed by: OBSTETRICS & GYNECOLOGY

## 2020-09-18 PROCEDURE — 25000132 ZZH RX MED GY IP 250 OP 250 PS 637: Performed by: STUDENT IN AN ORGANIZED HEALTH CARE EDUCATION/TRAINING PROGRAM

## 2020-09-18 PROCEDURE — 25000128 H RX IP 250 OP 636: Performed by: STUDENT IN AN ORGANIZED HEALTH CARE EDUCATION/TRAINING PROGRAM

## 2020-09-18 RX ORDER — ACETAMINOPHEN 325 MG/1
650 TABLET ORAL EVERY 6 HOURS PRN
Qty: 1 BOTTLE | Refills: 0 | Status: SHIPPED | OUTPATIENT
Start: 2020-09-18 | End: 2023-03-27

## 2020-09-18 RX ORDER — AMOXICILLIN 250 MG
1-2 CAPSULE ORAL 2 TIMES DAILY PRN
Qty: 60 TABLET | Refills: 0 | Status: SHIPPED | OUTPATIENT
Start: 2020-09-18

## 2020-09-18 RX ORDER — IBUPROFEN 400 MG/1
400 TABLET, FILM COATED ORAL EVERY 6 HOURS PRN
Qty: 90 TABLET | Refills: 0 | Status: SHIPPED | OUTPATIENT
Start: 2020-09-18 | End: 2023-03-27

## 2020-09-18 RX ORDER — OXYCODONE HYDROCHLORIDE 5 MG/1
5 TABLET ORAL EVERY 6 HOURS PRN
Qty: 5 TABLET | Refills: 0 | Status: SHIPPED | OUTPATIENT
Start: 2020-09-18

## 2020-09-18 RX ADMIN — ACETAMINOPHEN 650 MG: 325 TABLET, FILM COATED ORAL at 09:48

## 2020-09-18 RX ADMIN — ENOXAPARIN SODIUM 40 MG: 40 INJECTION SUBCUTANEOUS at 09:49

## 2020-09-18 RX ADMIN — OXYCODONE HYDROCHLORIDE 5 MG: 5 TABLET ORAL at 09:48

## 2020-09-18 RX ADMIN — BISACODYL 10 MG: 10 SUPPOSITORY RECTAL at 11:56

## 2020-09-18 RX ADMIN — IBUPROFEN 600 MG: 600 TABLET, FILM COATED ORAL at 11:52

## 2020-09-18 RX ADMIN — ACETAMINOPHEN 650 MG: 325 TABLET, FILM COATED ORAL at 17:27

## 2020-09-18 RX ADMIN — FAMOTIDINE 40 MG: 20 TABLET ORAL at 09:48

## 2020-09-18 ASSESSMENT — ACTIVITIES OF DAILY LIVING (ADL)
ADLS_ACUITY_SCORE: 13

## 2020-09-18 ASSESSMENT — PAIN DESCRIPTION - DESCRIPTORS
DESCRIPTORS: ACHING
DESCRIPTORS: ACHING

## 2020-09-18 NOTE — PLAN OF CARE
"A&Ox4.AVSS. Pt reported abdominal incision pain; decrease in pain with scheduled tylenol and PRN oxy 5 mg tab po.Pt did not receive her scheduled tylenol and ibuprofen overnight( charted \"pt refused\").Up independent to bathroom and in kurtz. Pt ate 100% of breakfast ( oat meal, fruit bowel and apple juice)and tolerated.Voids; but pt does not save urine. Pt reported that she had soft Bm yesterday.Pt reported that she passed gas yesterday; but not today yet. Pt had suppository today. Plan for discharge this afternoon.  "

## 2020-09-18 NOTE — PLAN OF CARE
Discharge  Pt is A&Ox4. AVSS. Abdominal incisional pain is controlled with scheduled tylenol and ibuprofen.Pt had oxy 5 mg tab po x1 today.Ate breakfast and dinner; pt tolerated diet.Denied N/V.PIV x2 removed; tip f catheter is intact. Discharge instruction was revised with pt using [email or web address]  over the phone.Pt stated understanding of discharge instruction.Pt left floor walking with family member with her belongings.  Plan:  Pt to continue to take medication and to continue to follow up with clinic appointments per discharge instruction.

## 2020-09-18 NOTE — PROGRESS NOTES
Gynecology Oncology Progress Note  9/18/2020    Sadi Bedoya is a 45 year old POD#2 s/p XL, RSO, appendectomy, staging biopsies.     Dz: Pelvic mass. 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 Pain well controlled. Tolerating PO intake without nausea or vomiting. Spontaneously voiding, +BM. Ambulating well.Denies fevers, chills, chest pain, SOB. No other questions or concerns.    Objective:   Patient Vitals for the past 24 hrs:   BP Temp Temp src Pulse Resp SpO2   09/17/20 2200 99/54 96.3  F (35.7  C) Oral 70 18 96 %   09/17/20 1413 105/67 97.6  F (36.4  C) Oral 83 18 93 %   09/17/20 1127 106/64 98.2  F (36.8  C) Oral 84 18 95 %   09/17/20 0753 90/54 96.8  F (36  C) Oral 75 15 95 %       General: in NAD  CV: RRR  Resp: CTAB, no increased work of breathing on room air  Abdomen: soft, appropriately tender, non distended, abdominal binder in place  Incision: c/d/i, dressing removed at bedside  Extremities: nontender, no edema, SCDs in place    I/Os  (Yesterday // Since Midnight)  IV: 952 mL // 0 mL  PO: 960 mL // 0 mL  Urine 2150 mL +1 unmeasured // 0 mL +1 unmeasured    Labs/Imaging:  Results for orders placed or performed during the hospital encounter of 09/16/20 (from the past 24 hour(s))   Basic metabolic panel   Result Value Ref Range    Sodium 138 133 - 144 mmol/L    Potassium 3.8 3.4 - 5.3 mmol/L    Chloride 109 94 - 109 mmol/L    Carbon Dioxide 24 20 - 32 mmol/L    Anion Gap 5 3 - 14 mmol/L    Glucose 139 (H) 70 - 99 mg/dL    Urea Nitrogen 7 7 - 30 mg/dL    Creatinine 0.67 0.52 - 1.04 mg/dL    GFR Estimate >90 >60 mL/min/[1.73_m2]    GFR Estimate If Black >90 >60 mL/min/[1.73_m2]    Calcium 8.2 (L) 8.5 - 10.1 mg/dL      Assessment: 34 y.o. POD#2 s/p exploratory laparotomy, right salpingo-oophorectomy, appendectomy, staging biopsies, omentectomy. Doing  well post operatively.    Plan:    Dz: Pelvic mass. Mucinous at least borderline tumor on frozen. Final pathology pending.     # Post-operative state  - Pain: controlled. Continue scheduled Tylenol and ibuprofen, PRN oxycodone.  - FEN: Regular diet.  - Appropriate surgical blood loss. No s/s of anemia.  - S/p cruz, voiding spontaneously   - Encourage IS. Ambulate at least 4x daily.   - Continue bowel regimen. PRN antiemetics.     # Schizoaffective Disorder  - Continue PTA Zyprexa, Risperdal, Trazodone     # GERD  - Continue PTA Pepcid    Ppx: SCDs, IS, Lovenox  Lines/Drains: PIV  Dispo: discharge home, possibly today    Keshia Young MD  PGY1      I saw and evaluated the patient. I agree with the findings and the plan of care as documented in Dr. Young 's note.   Home today    Clare Kahn MD  Gynecologic Oncology  Pager [phone]

## 2020-09-18 NOTE — PLAN OF CARE
Alert and oriented x 4. Vital signs stable. On room air. Good oral intake. Voided after cruz discontinued. Had BM, medium.Ambulates in room independently. Plan: continue care.

## 2020-09-21 ENCOUNTER — TELEPHONE (OUTPATIENT)
Dept: FAMILY MEDICINE | Facility: CLINIC | Age: 45
End: 2020-09-21

## 2020-09-21 ENCOUNTER — PATIENT OUTREACH (OUTPATIENT)
Dept: ONCOLOGY | Facility: CLINIC | Age: 45
End: 2020-09-21

## 2020-09-21 NOTE — TELEPHONE ENCOUNTER
This patient was discharged from [facility] on 09/17/2020.    Discharge Diagnosis: Ms. Sadi Bedoya was discharged from the hospital with follow up for postoperative check.    Follow-up instructions: Intramural, Submucous, And Subserous Leiomyoma Of Uterus, S/P Laparotomy    A follow-up visit has not been scheduled.      Please follow-up with patient.

## 2020-09-21 NOTE — OP NOTE
GYNECOLOGIC ONCOLOGY OPERATIVE NOTE        PATIENT: Sadi Bedoya      MRN: [id number]     DATE OF SURGERY: 9/16/2020     PREOPERATIVE DIAGNOSES:  Adnexal mass      POSTOPERATIVE DIAGNOSES:  Same as pre-operative diagnosis; mucinous at least borderline      OPERATIVE PROCEDURES:    1.Exploratory Laparotomy  2.Right salpingo-oophorectomy   3. Appendectomy  4. Omentectomy  5. Staging biopsies      SURGEON: Aisha Solis MD      ASSISTANTS:   Jen Ramon, Gyn Onc Fellow   Karmen Hernandez MD - Resident - Assisting      ANESTHESIA: General endotracheal.      ESTIMATED BLOOD LOSS: 200 mL     TOTAL INTRAVENOUS FLUIDS: 1000 mL crystalloid     TOTAL URINE OUTPUT: 100 cc clear urine in Martins at end of case     OPERATIVE FINDINGS:  Exam under anesthesia shows large, mobile mass extending above the umbilicus. On laparotomy, simple cystic mass arising from the right ovary measuring 17 cm . Filmy adhesions from the jejunum to the posterior mass. Normal left ovary and fallopian tube. Normal uterus. Right ureter visualized. No other evidence of disease along bowel or peritoneal surfaces. Hemostasis at end of case.      COMPLICATIONS: None noted.      CONDITION: Extubated, stable on transfer.      INDICATION FOR PROCEDURE:   This is a 45 year old female who initially presented with newly diagnosed 17 cm pelvic mass and normal . Following a thorough discussion of the risks, benefits, indications and alternatives she consented to the above combined procedure       OPERATIVE PROCEDURE IN DETAIL:  The consent was reviewed with the patient in the preoperative setting. She received prophylactic antibiotics. In addition, she received heparin for venous thrombosis prevention.  She was subsequently transferred to the operating room for the above-mentioned procedure. The patient was placed in dorsal lithotomy position. General anesthetic was obtained without noted difficulties. Martins catheter was placed under sterile techniques.  The patient was then prepped and draped for an abdominal procedure. Timeout was called at which point the patient's name, operative procedure and site was confirmed by the operative team.       A midline vertical skin incision was then made from the level of pubic symphysis and ultimately extending upto the umbilicus. Incision was performed sharply and carried through the subcutaneous layer with cautery. Fascia was incised and extended superiorly and inferiorly. Peritoneum was then elevated and entered sharply. Peritoneal incision was extended without any injury to nearby structures. At this point, exploration of the pelvis and upper abdomen was performed with above noted findings. Washings were collected.       A Bookwalter retractor was attached to the bed. The small  bowel adhesions were taken down sharply.  Additional blunt and sharp dissection was undertaken to dissect the bowel from the superior portion of the mass. The right gonadal vessels were then transected and suture ligated  very near the mass. Continued dissection was done to free the mass from its adhesions posteriorly and anteriorly to the bladder and uterus . Once it was dissected free, the mass was lifted from the abdomen and sent to pathology for frozen section. Retractors were placed for adequate visualization of the pelvis. The bowel was inspected and the bowel was run and then packed superiorly into the upper abdomen using moist laparotomy sponges. The right retroperitoneum was opened parallel to the gonadal vessels and the ureter was identified. The right utero-ovarian ligament was then isolated, doubly clamped, cut and suture ligated and the remainder of the right fallopian tube and the gonadal vessels were resected and sent to pathology. The right ovary returned showing mucinous borderline tumor , thus staging procedures were undertaken.     Attention was then turned to the omentectomy.  Complete omentectomy was performed extending from the  hepatic to the splenic flexure and extending to the underside of the greater curvature of the stomach. Total omentectomy was performed by mobilizing the omentum off of the transverse colon up to the gastroepiploic arcade where the feeding vessels were divided and ligated with the cautery.       Attention was then turned to the bowel again. The entire bowel was run without evidence of disease. When we reached the cecum we noted that the appendix appeared grossly normal, however given the mucinous histology an appendectomy was performed.  We removed the epipoica and isolated out the appendiceal artery which was clamped, cut and suture ligated.  We then used a clamp to crush the stump of the appendix and two silk ties were used to suture ligate the appendix which was then transected and sent for pathology.  The appendiceal stump was then cauterize.  At this point staging biopsies were obtained in the usual manner.     At this point, we performed irrigation of the pelvis and upper abdomen with 4 liters of warm saline.   All laparotomy sponges were next removed. Fascia was closed with 0 looped PDS in 2 segments meeting in the middle. Subcutaneous tissue was irrigated and skin closed with two running subcuticular sutures meeting in the middle and sterile dressing applied.     Sponge, lap, instrument and needle counts were reported as correct x2. The patient was then repositioned appropriately, recalled from the general anesthetic and was transferred to recovery unit in stable condition.    Jen Ramon  Gynecologic Oncology Fellow  [facility]     Attending Attestation:  I was present and scrubbed for the entire surgical procedure.  I have reviewed and edited above note and agree with findings as documented.    Aisha Solis MD  Gynecologic Oncology  [facility]

## 2020-09-24 LAB — COPATH REPORT: NORMAL

## 2020-10-13 ENCOUNTER — OFFICE VISIT (OUTPATIENT)
Dept: ONCOLOGY | Facility: CLINIC | Age: 45
End: 2020-10-13
Attending: OBSTETRICS & GYNECOLOGY
Payer: COMMERCIAL

## 2020-10-13 VITALS
OXYGEN SATURATION: 98 % | TEMPERATURE: 98.1 F | BODY MASS INDEX: 37.83 KG/M2 | HEIGHT: 67 IN | WEIGHT: 241 LBS | DIASTOLIC BLOOD PRESSURE: 84 MMHG | RESPIRATION RATE: 16 BRPM | SYSTOLIC BLOOD PRESSURE: 122 MMHG | HEART RATE: 75 BPM

## 2020-10-13 DIAGNOSIS — D39.11 OVARIAN TUMOR OF BORDERLINE MALIGNANCY, RIGHT: Primary | ICD-10-CM

## 2020-10-13 PROCEDURE — G0463 HOSPITAL OUTPT CLINIC VISIT: HCPCS

## 2020-10-13 PROCEDURE — 99024 POSTOP FOLLOW-UP VISIT: CPT | Performed by: OBSTETRICS & GYNECOLOGY

## 2020-10-13 ASSESSMENT — MIFFLIN-ST. JEOR: SCORE: 1775.92

## 2020-10-13 ASSESSMENT — PAIN SCALES - GENERAL: PAINLEVEL: NO PAIN (0)

## 2020-10-13 NOTE — PATIENT INSTRUCTIONS
Pathology reviewed    Okay to return to normal activities in two weeks.    Return in one year for exam and pelvic ultrasound    Aisha Solis MD  Gynecologic Oncology  [facility]

## 2020-10-13 NOTE — LETTER
10/13/2020         RE: Sadi Bedoya  [address]  [place] [facility] [id number]        Dear Colleague,    Thank you for referring your patient, Sadi Bedoya, to the [facility]. Please see a copy of my visit note below.                            Consult Notes on Referred Patient    Date: 10/13/2020     The patient returns today for follow-up.    She is status post Exploratory laparotomy, right salpingo-oophorectomy, appendectomy, staging biopsies, omentectomy on 9/16/20 for fertility sparing treatment of a large right ovarian mass,  normal preop, CA 19-9 44.  Final pathology showed a mucinous borderline ovarian tumor measuring 26.4cm, with microscopic focus of intraepithelial carcinoma, ovarian surface intact and uninvolved, fallopian tube negative, biopsies negative, omentum negative, appendix negative, cytology negative.  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  Overall doing well, no complaints. No fevers, no chills, no nausea or vomiting.  Appetite and energy good, weight stable.  No chest pain or shortness or breath.  No new pain symptoms.  No abdominal pain, no difficulty with bowel or bladder function, no constipation or diarrhea, no urinary leakage/urgency/pain.  No vaginal bleeding, is not sexually active.  No lower extremity pain or swelling.          Past Medical History:    Past Medical History:   Diagnosis Date     Obesity, Class III, BMI 40-49.9 (morbid obesity) (H)      Ovarian cystic mass      Schizoaffective disorder, bipolar type (H)          Past Surgical History:    Past Surgical History:   Procedure Laterality Date     CHOLECYSTECTOMY       HYSTERECTOMY TOTAL ABDOMINAL, BILATERAL SALPINGO-OOPHORECTOMY, NODE DISSECTION, COMBINED N/A 9/16/2020    Procedure: Exploratory laparotomy, right salpingo-oophorectomy, appendectomy, staging  "biopsies, omentectomy;  Surgeon: Aisha Solis MD;  Location:  OR         Health Maintenance:  Health Maintenance Due   Topic Date Due     HIV SCREENING  01/01/1991     HEPATITIS C SCREENING  01/01/1994     DTAP/TDAP/TD IMMUNIZATION (3 - Td) 01/11/2009     PREVENTIVE CARE VISIT  10/24/2018     INFLUENZA VACCINE (1) 09/01/2020         Current Medications:     has a current medication list which includes the following prescription(s): acetaminophen, famotidine, ibuprofen, olanzapine, order for dme, order for dme, oxycodone, prenatal multivitamin w/iron, risperidone, senna-docusate, trazodone, vitamin d2, and vitamin d (cholecalciferol).       Allergies:     [unfilled]        Social History:     Social History     Tobacco Use     Smoking status: Never Smoker     Smokeless tobacco: Never Used   Substance Use Topics     Alcohol use: No       History   Drug Use No           Family History:     The patient's family history is notable for :    Family History   Problem Relation Age of Onset     Liver Disease Mother          Physical Exam:     /84 (BP Location: Right arm, Patient Position: Sitting, Cuff Size: Adult Regular)   Pulse 75   Temp 98.1  F (36.7  C) (Oral)   Resp 16   Ht 1.702 m (5' 7.01\")   Wt 109.3 kg (241 lb)   SpO2 98%   BMI 37.74 kg/m    Body mass index is 37.74 kg/m .    General Appearance: healthy and alert, no distress     Musculoskeletal: extremities non tender and without edema    Skin: no lesions or rashes     Neurological: normal gait, no gross defects     Psychiatric: appropriate mood and affect                               Hematological: normal cervical, supraclavicular and inguinal lymph nodes     Gastrointestinal:       abdomen soft, non-tender, non-distended, no organomegaly or masses. 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  Mucinous borderline, early stage, treated with fertility sparing surgery. Discussed risk for recurrence, typically treated with surgery in the event of recurrence, occasional higher histology in the setting of recurrence.  Discussed in detail, don't recommend additional treatment at this time, just surveillance.    Recommend exam, pelvic US on an annual basis.  Questions answered, patient expressed understanding of plan of care.    Return in one year.    Aisha Solis MD  Gynecologic Oncology  [facility]      CC  Patient Care Team:  Clinic, [facility] as PCP - General (Clinic)  Carlos Curry MD as MD (OB/Gyn)  Theresa Vanegas PA-C as Assigned PCP

## 2020-10-13 NOTE — NURSING NOTE
"Oncology Rooming Note    October 13, 2020 12:07 PM   Sadi Bedoya is a 45 year old female who presents for:    Chief Complaint   Patient presents with     Oncology Clinic Visit     Return; Adnexal Mass; Post-Op     Initial Vitals: /84 (BP Location: Right arm, Patient Position: Sitting, Cuff Size: Adult Regular)   Pulse 75   Temp 98.1  F (36.7  C) (Oral)   Resp 16   Ht 1.702 m (5' 7.01\")   Wt 109.3 kg (241 lb)   SpO2 98%   BMI 37.74 kg/m   Estimated body mass index is 37.74 kg/m  as calculated from the following:    Height as of this encounter: 1.702 m (5' 7.01\").    Weight as of this encounter: 109.3 kg (241 lb). Body surface area is 2.27 meters squared.  No Pain (0) Comment: Data Unavailable   No LMP recorded. (Menstrual status: Irregular Periods).  Allergies reviewed: Yes  Medications reviewed: Yes    Medications: Medication refills not needed today.  Pharmacy name entered into [employer]:    [id number] - [place], [place] - [address] AT [facility] [address]  TOTAL MEDICAL SUPPLY    Clinical concerns: Patient states she has multiple questions for MD. Opal Carty CMA              "

## 2020-11-11 NOTE — PROGRESS NOTES
Consult Notes on Referred Patient    Date: 10/13/2020     The patient returns today for follow-up.    She is status post Exploratory laparotomy, right salpingo-oophorectomy, appendectomy, staging biopsies, omentectomy on 9/16/20 for fertility sparing treatment of a large right ovarian mass,  normal preop, CA 19-9 44.  Final pathology showed a mucinous borderline ovarian tumor measuring 26.4cm, with microscopic focus of intraepithelial carcinoma, ovarian surface intact and uninvolved, fallopian tube negative, biopsies negative, omentum negative, appendix negative, cytology negative.  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  Overall doing well, no complaints. No fevers, no chills, no nausea or vomiting.  Appetite and energy good, weight stable.  No chest pain or shortness or breath.  No new pain symptoms.  No abdominal pain, no difficulty with bowel or bladder function, no constipation or diarrhea, no urinary leakage/urgency/pain.  No vaginal bleeding, is not sexually active.  No lower extremity pain or swelling.          Past Medical History:    Past Medical History:   Diagnosis Date     Obesity, Class III, BMI 40-49.9 (morbid obesity) (H)      Ovarian cystic mass      Schizoaffective disorder, bipolar type (H)          Past Surgical History:    Past Surgical History:   Procedure Laterality Date     CHOLECYSTECTOMY       HYSTERECTOMY TOTAL ABDOMINAL, BILATERAL SALPINGO-OOPHORECTOMY, NODE DISSECTION, COMBINED N/A 9/16/2020    Procedure: Exploratory laparotomy, right salpingo-oophorectomy, appendectomy, staging biopsies, omentectomy;  Surgeon: Aisha Solis MD;  Location:  OR         Health Maintenance:  Health Maintenance Due   Topic Date Due     HIV SCREENING  01/01/1991     HEPATITIS C SCREENING  01/01/1994     DTAP/TDAP/TD IMMUNIZATION (3 - Td) 01/11/2009     PREVENTIVE CARE VISIT  " 10/24/2018     INFLUENZA VACCINE (1) 09/01/2020         Current Medications:     has a current medication list which includes the following prescription(s): acetaminophen, famotidine, ibuprofen, olanzapine, order for dme, order for dme, oxycodone, prenatal multivitamin w/iron, risperidone, senna-docusate, trazodone, vitamin d2, and vitamin d (cholecalciferol).       Allergies:     [unfilled]        Social History:     Social History     Tobacco Use     Smoking status: Never Smoker     Smokeless tobacco: Never Used   Substance Use Topics     Alcohol use: No       History   Drug Use No           Family History:     The patient's family history is notable for :    Family History   Problem Relation Age of Onset     Liver Disease Mother          Physical Exam:     /84 (BP Location: Right arm, Patient Position: Sitting, Cuff Size: Adult Regular)   Pulse 75   Temp 98.1  F (36.7  C) (Oral)   Resp 16   Ht 1.702 m (5' 7.01\")   Wt 109.3 kg (241 lb)   SpO2 98%   BMI 37.74 kg/m    Body mass index is 37.74 kg/m .    General Appearance: healthy and alert, no distress     Musculoskeletal: extremities non tender and without edema    Skin: no lesions or rashes     Neurological: normal gait, no gross defects     Psychiatric: appropriate mood and affect                               Hematological: normal cervical, supraclavicular and inguinal lymph nodes     Gastrointestinal:       abdomen soft, non-tender, non-distended, no organomegaly or masses. 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  Mucinous borderline, early stage, treated with fertility sparing surgery. Discussed risk for recurrence, typically treated with surgery in the event of recurrence, occasional higher histology in the setting of recurrence.  Discussed in detail, don't recommend additional treatment at this time, just surveillance.    Recommend exam, pelvic US on an annual basis.  Questions answered, patient expressed understanding of plan of care.    Return in one year.    Aris Solis MD  Gynecologic Oncology  [facility]      CC  Patient Care Team:  [facility] as PCP - General (Clinic)  Carlos Curry MD as MD (OB/Gyn)  Theresa Vanegas PA-C as Assigned PCP  Aris Solis MD as Assigned Cancer Care Provider  Carlos Curry MD as Assigned OBGYN Provider  ARIS SOLIS

## 2021-09-07 NOTE — ANESTHESIA CARE TRANSFER NOTE
Patient: Sadi Bedoya    Procedure(s):  Exploratory laparotomy, right salpingo-oophorectomy, appendectomy, staging biopsies, omentectomy    Diagnosis: Adnexal mass [N94.89]  Diagnosis Additional Information: No value filed.    Anesthesia Type:   General, Peripheral Nerve Block     Note:  Airway :Face Mask  Patient transferred to:PACU  Comments: Patient suctioned prior to extubation. Following commands, breathing spontaneously, opening eyes. Extubated to 6L facemask. Transferred to PACU. Report to RN. Vital signs stable. Awake and talking in PACU. Handoff Report: Identifed the Patient, Identified the Reponsible Provider, Reviewed the pertinent medical history, Discussed the surgical course, Reviewed Intra-OP anesthesia mangement and issues during anesthesia, Set expectations for post-procedure period and Allowed opportunity for questions and acknowledgement of understanding      Vitals: (Last set prior to Anesthesia Care Transfer)    CRNA VITALS  9/16/2020 1552 - 9/16/2020 1637      9/16/2020             Pulse:  89    SpO2:  100 %                Electronically Signed By: ZULEMA Herrera CRNA  September 16, 2020  4:37 PM   Propranolol Pregnancy And Lactation Text: This medication is Pregnancy Category C and it isn't known if it is safe during pregnancy. It is excreted in breast milk.

## 2022-05-03 ENCOUNTER — APPOINTMENT (OUTPATIENT)
Dept: CT IMAGING | Facility: CLINIC | Age: 47
End: 2022-05-03
Attending: EMERGENCY MEDICINE
Payer: COMMERCIAL

## 2022-05-03 ENCOUNTER — HOSPITAL ENCOUNTER (EMERGENCY)
Facility: CLINIC | Age: 47
Discharge: HOME | End: 2022-05-03
Attending: EMERGENCY MEDICINE | Admitting: EMERGENCY MEDICINE
Payer: COMMERCIAL

## 2022-05-03 VITALS
DIASTOLIC BLOOD PRESSURE: 73 MMHG | TEMPERATURE: 98.2 F | RESPIRATION RATE: 16 BRPM | OXYGEN SATURATION: 97 % | SYSTOLIC BLOOD PRESSURE: 107 MMHG | HEART RATE: 85 BPM

## 2022-05-03 DIAGNOSIS — S16.1XXA STRAIN OF NECK MUSCLE, INITIAL ENCOUNTER: ICD-10-CM

## 2022-05-03 DIAGNOSIS — S06.0X0A CONCUSSION WITHOUT LOSS OF CONSCIOUSNESS, INITIAL ENCOUNTER: ICD-10-CM

## 2022-05-03 LAB
HCG UR QL: NEGATIVE
INTERNAL QC OK POCT: NORMAL
POCT KIT EXPIRATION DATE: NORMAL
POCT KIT LOT NUMBER: NORMAL

## 2022-05-03 PROCEDURE — 70450 CT HEAD/BRAIN W/O DYE: CPT

## 2022-05-03 PROCEDURE — 250N000011 HC RX IP 250 OP 636: Performed by: EMERGENCY MEDICINE

## 2022-05-03 PROCEDURE — 99284 EMERGENCY DEPT VISIT MOD MDM: CPT | Performed by: EMERGENCY MEDICINE

## 2022-05-03 PROCEDURE — 72125 CT NECK SPINE W/O DYE: CPT

## 2022-05-03 PROCEDURE — 81025 URINE PREGNANCY TEST: CPT | Performed by: EMERGENCY MEDICINE

## 2022-05-03 PROCEDURE — 99285 EMERGENCY DEPT VISIT HI MDM: CPT | Mod: 25 | Performed by: EMERGENCY MEDICINE

## 2022-05-03 RX ORDER — ONDANSETRON 4 MG/1
4 TABLET, ORALLY DISINTEGRATING ORAL EVERY 8 HOURS PRN
Qty: 10 TABLET | Refills: 0 | Status: SHIPPED | OUTPATIENT
Start: 2022-05-03

## 2022-05-03 RX ORDER — ONDANSETRON 4 MG/1
4 TABLET, ORALLY DISINTEGRATING ORAL ONCE
Status: COMPLETED | OUTPATIENT
Start: 2022-05-03 | End: 2022-05-03

## 2022-05-03 RX ORDER — ONDANSETRON 4 MG/1
4 TABLET, ORALLY DISINTEGRATING ORAL EVERY 8 HOURS PRN
Qty: 10 TABLET | Refills: 0 | Status: SHIPPED | OUTPATIENT
Start: 2022-05-03 | End: 2022-05-03

## 2022-05-03 RX ADMIN — ONDANSETRON 4 MG: 4 TABLET, ORALLY DISINTEGRATING ORAL at 20:39

## 2022-05-03 ASSESSMENT — ENCOUNTER SYMPTOMS
SHORTNESS OF BREATH: 0
VOMITING: 0
DYSURIA: 0
SLEEP DISTURBANCE: 0
SORE THROAT: 0
FEVER: 0
HEADACHES: 1
ABDOMINAL PAIN: 0
COUGH: 0
EYE REDNESS: 0
DIFFICULTY URINATING: 0
BACK PAIN: 0
NECK PAIN: 1
NAUSEA: 0

## 2022-05-03 NOTE — ED TRIAGE NOTES
"Pt fell in bathroom because of slipping and hit head on the wall yesterday. Felt dizzy yesterday but didn't lose consciousness. Daughter reports she was \"kind of out of it yesterday\". Decided to come in today d/t headache.       "

## 2022-05-04 NOTE — ED PROVIDER NOTES
ED Provider Note  [facility]      History     Chief Complaint   Patient presents with     Headache     Head Injury     HPI  Sadi Bedoya is a 47 year old female who presents emerged part for evaluation of worsening headache after slip and fall yesterday.  Patient and her daughter state that she was washing her feet in a sink yesterday and then the bathroom at her home when she slipped and fell backwards.  She struck her right posterior head on the wall.  The patient's daughter states that yesterday, the patient appeared fatigued and was exhibiting some mild mental slowing.  Today, though symptoms have improved but she has had progressive worsening of headache.  She was nauseous yesterday and today but denies any vomiting.  She denies any photophobia.  No weakness or numbness.  She also complains of pain in the upper neck at the base of the skull.  She also has some pain of the right posterior chest wall.  She denies any dyspnea.  No cough.  Patient denies any abdominal pain.  No extremity injury.  No anticoagulant use.    Past Medical History  Past Medical History:   Diagnosis Date     Obesity, Class III, BMI 40-49.9 (morbid obesity) (H)      Ovarian cystic mass      Schizoaffective disorder, bipolar type (H)      Past Surgical History:   Procedure Laterality Date     CHOLECYSTECTOMY       HYSTERECTOMY TOTAL ABDOMINAL, BILATERAL SALPINGO-OOPHORECTOMY, NODE DISSECTION, COMBINED N/A 9/16/2020    Procedure: Exploratory laparotomy, right salpingo-oophorectomy, appendectomy, staging biopsies, omentectomy;  Surgeon: Aisha Solis MD;  Location: [facility] OR     acetaminophen (TYLENOL) 325 MG tablet  famotidine (PEPCID) 40 MG tablet  ibuprofen (ADVIL/MOTRIN) 400 MG tablet  OLANZapine (ZYPREXA) 5 MG tablet  ondansetron (ZOFRAN ODT) 4 MG ODT tab  order for DME  order for DME  oxyCODONE (ROXICODONE) 5 MG tablet  Prenatal Vit-Fe Fumarate-FA (PRENATAL MULTIVITAMIN PLUS IRON) 27-0.8 MG TABS per  tablet  senna-docusate (SENOKOT-S/PERICOLACE) 8.6-50 MG tablet  traZODone (DESYREL) 150 MG tablet  vitamin D (ERGOCALCIFEROL) 87342 UNIT capsule  Vitamin D, Cholecalciferol, 25 MCG (1000 UT) CAPS  risperiDONE (RISPERDAL) 2 MG tablet      Allergies   Allergen Reactions     Alcohol [Ethanol]      Gelatin      No Known Drug Allergy      Family History  Family History   Problem Relation Age of Onset     Liver Disease Mother      Social History   Social History     Tobacco Use     Smoking status: Never Smoker     Smokeless tobacco: Never Used   Substance Use Topics     Alcohol use: No     Drug use: No      Past medical history, past surgical history, medications, allergies, family history, and social history were reviewed with the patient. No additional pertinent items.       Review of Systems   Constitutional: Negative for fever.   HENT: Negative for sore throat.    Eyes: Negative for redness.   Respiratory: Negative for cough and shortness of breath.    Cardiovascular: Negative for chest pain.   Gastrointestinal: Negative for abdominal pain, nausea and vomiting.   Genitourinary: Negative for difficulty urinating and dysuria.   Musculoskeletal: Positive for neck pain. Negative for back pain.   Skin: Negative for rash.   Neurological: Positive for headaches.   Psychiatric/Behavioral: Negative for sleep disturbance.   All other systems reviewed and are negative.    A complete review of systems was performed with pertinent positives and negatives noted in the HPI, and all other systems negative.    Physical Exam   BP: 102/74  Pulse: 77  Temp: 98.2  F (36.8  C)  Resp: 16  SpO2: 100 %  Physical Exam  Vitals and nursing note reviewed.   Constitutional:       General: She is not in acute distress.     Appearance: Normal appearance. She is not diaphoretic.   HENT:      Head: Normocephalic.        Mouth/Throat:      Pharynx: No oropharyngeal exudate.   Eyes:      General: No scleral icterus.     Extraocular Movements:      Right  eye: Nystagmus present.      Left eye: Nystagmus present.      Pupils: Pupils are equal, round, and reactive to light.   Neck:     Cardiovascular:      Rate and Rhythm: Normal rate and regular rhythm.      Pulses: Normal pulses.      Heart sounds: Normal heart sounds.   Pulmonary:      Effort: No respiratory distress.      Breath sounds: Normal breath sounds.   Abdominal:      General: Bowel sounds are normal.      Palpations: Abdomen is soft.      Tenderness: There is no abdominal tenderness.   Musculoskeletal:         General: No tenderness. Normal range of motion.   Skin:     General: Skin is warm.      Capillary Refill: Capillary refill takes less than 2 seconds.      Findings: No rash.   Neurological:      General: No focal deficit present.      Mental Status: She is alert.      Cranial Nerves: No cranial nerve deficit.      Motor: No weakness.      Coordination: Coordination normal.      Gait: Gait normal.   Psychiatric:         Mood and Affect: Mood normal.         ED Course      Procedures       The medical record was reviewed and interpreted.  Current images reviewed and interpreted: No intracranial hemorrhage.  No cervical spine fracture..        Results for orders placed or performed during the hospital encounter of 05/03/22   CT Head w/o Contrast     Status: None    Narrative    EXAM: CT HEAD W/O CONTRAST  LOCATION: [facility]  DATE/TIME: 5/3/2022 7:11 PM    INDICATION: Head trauma, abnormal mental status (Age 19-64y)  COMPARISON: None.  TECHNIQUE: Routine CT Head without IV contrast. Multiplanar reformats. Dose reduction techniques were used.    FINDINGS:  INTRACRANIAL CONTENTS: No intracranial hemorrhage, extraaxial collection, or mass effect.  No CT evidence of acute infarct. Normal parenchymal attenuation. Normal ventricles and sulci.     VISUALIZED ORBITS/SINUSES/MASTOIDS: No intraorbital abnormality. No paranasal sinus mucosal disease. No middle ear or  mastoid effusion.    BONES/SOFT TISSUES: No acute abnormality.      Impression    IMPRESSION:  1.  No acute intracranial process.   CT Cervical Spine w/o Contrast     Status: None    Narrative    EXAM: CT CERVICAL SPINE W/O CONTRAST  LOCATION: [facility]  DATE/TIME: 5/3/2022 7:12 PM    INDICATION: Neck trauma, impaired ROM (Age 16-64y)  COMPARISON: None.  TECHNIQUE: Routine CT Cervical Spine without IV contrast. Multiplanar reformats. Dose reduction techniques were used.    FINDINGS:  VERTEBRA: Cervical vertebral body heights are maintained. Grade 1 anterolisthesis C2 on C3 and C3 on C4. No acute cervical spine fracture.     CANAL/FORAMINA: No canal or neural foraminal stenosis.    PARASPINAL: No extraspinal abnormality.      Impression    IMPRESSION:  1.  No acute cervical spine fracture.   hCG qual urine POCT     Status: Normal   Result Value Ref Range    HCG Qual Urine Negative Negative    Internal QC Check POCT Valid Valid    POCT Kit Lot Number [id number]     POCT Kit Expiration Date [id number]      Medications   ondansetron (ZOFRAN-ODT) ODT tab 4 mg (4 mg Oral Given 5/3/22 2039)        Assessments & Plan (with Medical Decision Making)   47 year old female to the emergency department for evaluation of injury sustained in a slip and fall yesterday.  She has progressive worsening of headache today and appeared mildly confused/altered yesterday after she slipped in her bathroom and struck the posterior aspect of her head.  She also has some midline cervical neck pain.  Due to the progressive nature of her symptoms, CT imaging was performed.  There is no intracranial hemorrhage or evidence for cervical spine fracture.  She does have some mild nystagmus on her physical examination so I suspect her symptoms are from a mild concussion.  Head injury instructions and cervical spine strain instructions provided.  Ice, ibuprofen, and acetaminophen recommended.  Activity as tolerated.   Importance of avoidance of further head injury discussed.  Primary care follow-up in 1 week as needed.  Return precautions provided.  Avoidance of screen use if symptoms exacerbate with their use recommended/discussed.    I have reviewed the nursing notes. I have reviewed the findings, diagnosis, plan and need for follow up with the patient.    Discharge Medication List as of 5/3/2022  8:26 PM          Final diagnoses:   Concussion without loss of consciousness, initial encounter   Strain of neck muscle, initial encounter       --  Todd Davila Md  [facility] EMERGENCY DEPARTMENT  5/3/2022     Todd Davila MD  05/03/22 2310

## 2022-05-04 NOTE — DISCHARGE INSTRUCTIONS
Avoid screen use if your symptoms get worse when using your smart phone, computer, or watching TV.  Take ondansetron as needed for nausea.    Follow-up with your primary care clinic in 1 week as needed if ongoing symptoms.    Return the emergency department if worsening symptoms, vomiting, weakness, trouble walking, trouble talking, or further concerns.

## 2022-10-22 ENCOUNTER — HOSPITAL ENCOUNTER (EMERGENCY)
Facility: CLINIC | Age: 47
Discharge: HOME | End: 2022-10-22
Attending: FAMILY MEDICINE | Admitting: FAMILY MEDICINE
Payer: COMMERCIAL

## 2022-10-22 VITALS
WEIGHT: 271 LBS | TEMPERATURE: 97.8 F | DIASTOLIC BLOOD PRESSURE: 70 MMHG | SYSTOLIC BLOOD PRESSURE: 121 MMHG | BODY MASS INDEX: 42.43 KG/M2 | HEART RATE: 72 BPM | RESPIRATION RATE: 16 BRPM | OXYGEN SATURATION: 98 %

## 2022-10-22 DIAGNOSIS — R10.13 ABDOMINAL PAIN, EPIGASTRIC: ICD-10-CM

## 2022-10-22 DIAGNOSIS — J21.0 RSV BRONCHIOLITIS: ICD-10-CM

## 2022-10-22 DIAGNOSIS — K21.9 GASTROESOPHAGEAL REFLUX DISEASE WITHOUT ESOPHAGITIS: ICD-10-CM

## 2022-10-22 LAB
ALBUMIN SERPL-MCNC: 2.9 G/DL (ref 3.4–5)
ALBUMIN UR-MCNC: NEGATIVE MG/DL
ALP SERPL-CCNC: 90 U/L (ref 40–150)
ALT SERPL W P-5'-P-CCNC: 20 U/L (ref 0–50)
ANION GAP SERPL CALCULATED.3IONS-SCNC: 3 MMOL/L (ref 3–14)
APPEARANCE UR: CLEAR
AST SERPL W P-5'-P-CCNC: 29 U/L (ref 0–45)
BACTERIA #/AREA URNS HPF: ABNORMAL /HPF
BASOPHILS # BLD AUTO: 0.1 10E3/UL (ref 0–0.2)
BASOPHILS NFR BLD AUTO: 1 %
BILIRUB SERPL-MCNC: 0.5 MG/DL (ref 0.2–1.3)
BILIRUB UR QL STRIP: NEGATIVE
BUN SERPL-MCNC: 11 MG/DL (ref 7–30)
CALCIUM SERPL-MCNC: 8.5 MG/DL (ref 8.5–10.1)
CHLORIDE BLD-SCNC: 108 MMOL/L (ref 94–109)
CO2 SERPL-SCNC: 28 MMOL/L (ref 20–32)
COLOR UR AUTO: ABNORMAL
CREAT SERPL-MCNC: 0.51 MG/DL (ref 0.52–1.04)
EOSINOPHIL # BLD AUTO: 0 10E3/UL (ref 0–0.7)
EOSINOPHIL NFR BLD AUTO: 1 %
ERYTHROCYTE [DISTWIDTH] IN BLOOD BY AUTOMATED COUNT: 12.1 % (ref 10–15)
FLUAV RNA SPEC QL NAA+PROBE: NEGATIVE
FLUBV RNA RESP QL NAA+PROBE: NEGATIVE
GFR SERPL CREATININE-BSD FRML MDRD: >90 ML/MIN/1.73M2
GLUCOSE BLD-MCNC: 110 MG/DL (ref 70–99)
GLUCOSE UR STRIP-MCNC: NEGATIVE MG/DL
HCT VFR BLD AUTO: 37.2 % (ref 35–47)
HGB BLD-MCNC: 11.9 G/DL (ref 11.7–15.7)
HGB UR QL STRIP: NEGATIVE
IMM GRANULOCYTES # BLD: 0 10E3/UL
IMM GRANULOCYTES NFR BLD: 0 %
KETONES UR STRIP-MCNC: NEGATIVE MG/DL
LEUKOCYTE ESTERASE UR QL STRIP: NEGATIVE
LIPASE SERPL-CCNC: 80 U/L (ref 73–393)
LYMPHOCYTES # BLD AUTO: 1.9 10E3/UL (ref 0.8–5.3)
LYMPHOCYTES NFR BLD AUTO: 25 %
MCH RBC QN AUTO: 30.3 PG (ref 26.5–33)
MCHC RBC AUTO-ENTMCNC: 32 G/DL (ref 31.5–36.5)
MCV RBC AUTO: 95 FL (ref 78–100)
MONOCYTES # BLD AUTO: 0.5 10E3/UL (ref 0–1.3)
MONOCYTES NFR BLD AUTO: 6 %
MUCOUS THREADS #/AREA URNS LPF: PRESENT /LPF
NEUTROPHILS # BLD AUTO: 5 10E3/UL (ref 1.6–8.3)
NEUTROPHILS NFR BLD AUTO: 67 %
NITRATE UR QL: NEGATIVE
NRBC # BLD AUTO: 0 10E3/UL
NRBC BLD AUTO-RTO: 0 /100
PH UR STRIP: 6 [PH] (ref 5–7)
PLATELET # BLD AUTO: 327 10E3/UL (ref 150–450)
POTASSIUM BLD-SCNC: 5 MMOL/L (ref 3.4–5.3)
PROT SERPL-MCNC: 7.8 G/DL (ref 6.8–8.8)
RBC # BLD AUTO: 3.93 10E6/UL (ref 3.8–5.2)
RBC URINE: <1 /HPF
RSV RNA SPEC NAA+PROBE: POSITIVE
SARS-COV-2 RNA RESP QL NAA+PROBE: NEGATIVE
SODIUM SERPL-SCNC: 139 MMOL/L (ref 133–144)
SP GR UR STRIP: 1.01 (ref 1–1.03)
SQUAMOUS EPITHELIAL: 2 /HPF
TROPONIN I SERPL HS-MCNC: 4 NG/L
UROBILINOGEN UR STRIP-MCNC: NORMAL MG/DL
WBC # BLD AUTO: 7.5 10E3/UL (ref 4–11)
WBC URINE: 1 /HPF

## 2022-10-22 PROCEDURE — 80053 COMPREHEN METABOLIC PANEL: CPT

## 2022-10-22 PROCEDURE — 87637 SARSCOV2&INF A&B&RSV AMP PRB: CPT

## 2022-10-22 PROCEDURE — 81001 URINALYSIS AUTO W/SCOPE: CPT

## 2022-10-22 PROCEDURE — 82040 ASSAY OF SERUM ALBUMIN: CPT

## 2022-10-22 PROCEDURE — 84484 ASSAY OF TROPONIN QUANT: CPT

## 2022-10-22 PROCEDURE — 93005 ELECTROCARDIOGRAM TRACING: CPT | Performed by: FAMILY MEDICINE

## 2022-10-22 PROCEDURE — 258N000003 HC RX IP 258 OP 636

## 2022-10-22 PROCEDURE — 85025 COMPLETE CBC W/AUTO DIFF WBC: CPT

## 2022-10-22 PROCEDURE — 250N000011 HC RX IP 250 OP 636

## 2022-10-22 PROCEDURE — 250N000009 HC RX 250

## 2022-10-22 PROCEDURE — 99284 EMERGENCY DEPT VISIT MOD MDM: CPT | Mod: 25 | Performed by: FAMILY MEDICINE

## 2022-10-22 PROCEDURE — C9803 HOPD COVID-19 SPEC COLLECT: HCPCS | Performed by: FAMILY MEDICINE

## 2022-10-22 PROCEDURE — 36415 COLL VENOUS BLD VENIPUNCTURE: CPT

## 2022-10-22 PROCEDURE — 96361 HYDRATE IV INFUSION ADD-ON: CPT | Performed by: FAMILY MEDICINE

## 2022-10-22 PROCEDURE — 83690 ASSAY OF LIPASE: CPT

## 2022-10-22 PROCEDURE — 96374 THER/PROPH/DIAG INJ IV PUSH: CPT | Performed by: FAMILY MEDICINE

## 2022-10-22 PROCEDURE — 93010 ELECTROCARDIOGRAM REPORT: CPT | Performed by: FAMILY MEDICINE

## 2022-10-22 PROCEDURE — 250N000013 HC RX MED GY IP 250 OP 250 PS 637

## 2022-10-22 RX ORDER — ONDANSETRON 2 MG/ML
4 INJECTION INTRAMUSCULAR; INTRAVENOUS EVERY 30 MIN PRN
Status: DISCONTINUED | OUTPATIENT
Start: 2022-10-22 | End: 2022-10-22 | Stop reason: HOSPADM

## 2022-10-22 RX ADMIN — SODIUM CHLORIDE 1000 ML: 9 INJECTION, SOLUTION INTRAVENOUS at 09:19

## 2022-10-22 RX ADMIN — LIDOCAINE HYDROCHLORIDE 30 ML: 20 SOLUTION ORAL; TOPICAL at 09:20

## 2022-10-22 RX ADMIN — ONDANSETRON 4 MG: 2 INJECTION INTRAMUSCULAR; INTRAVENOUS at 09:19

## 2022-10-22 ASSESSMENT — ACTIVITIES OF DAILY LIVING (ADL)
ADLS_ACUITY_SCORE: 35
ADLS_ACUITY_SCORE: 35

## 2022-10-22 ASSESSMENT — ENCOUNTER SYMPTOMS
DIFFICULTY URINATING: 0
DYSURIA: 0
COUGH: 0
NAUSEA: 1
RHINORRHEA: 1
EYE DISCHARGE: 1
DIARRHEA: 0
ABDOMINAL PAIN: 1
DIZZINESS: 0
SORE THROAT: 1
FLANK PAIN: 0
VOMITING: 0
FEVER: 1
CONSTIPATION: 0
BLOOD IN STOOL: 0
SHORTNESS OF BREATH: 1
HEMATURIA: 0

## 2022-10-22 NOTE — ED PROVIDER NOTES
I had aED Provider Note  [facility]      History     Chief Complaint  Abdominal pain and URI symptoms    HPI  Sadi Bedoya is a 47 year old female PMH schizoaffective disorder s/p cholecystectomy and hysterectomy who presents for evaluation for abdominal pain. The patient reports 8-day history of gradual-onset, epigastric abdominal pain radiating to her chest and left breast. She describes her pain as sharp. Her pain occurs in episodes throughout the night and day- lasting for a few seconds before resolving spontaneously and returning 10 minutes later. Her pain is associated with significant nausea, and she has had decrease appetite secondary to pain/nausea. No vomiting. She also endorses mild shortness of breath with severe pain episodes. Her pain worsens after eating and with movement. In addition to her abdominal pain, the patient reports 8-day history of URI symptoms including subjective fever, intermittent generalized headache, mild sore throat, and clear rhinorrhea. She reports being evaluated at a walk-in clinic in [facility] last week for her symptoms. She was prescribed a 5-day course of an antibiotic and completed this without improvement. She is unsure of the name of the antibiotic and no records found per chart review. The patient has also tried Tylenol for her symptoms with mild improvement, last 650 mg at 0600. She has not used NSAID's. No alcohol use. No recent travel or sick contacts. She is vaccinated against COVID, but not influenza. The patient otherwise denies vision changes, ear pain, CP, diarrhea, bloody stools, dysuria, hematuria, vaginal discharge, and rashes. Her last bowel movement was today.     Past Medical History  Obesity, Class III, BMI 40-49.9 (morbid obesity) (H)   Ovarian cystic mass s/p removal   Schizoaffective disorder, bipolar type (H)     Past Medical History  Cholecystectomy   Hysterectomy total, bilateral salpingo-oophorectomy      Medications  The patient only reports taking:  acetaminophen (TYLENOL) 325 MG tablet  traZODone (DESYREL) 150 MG tablet  vitamin D (ERGOCALCIFEROL) 56110 UNIT capsule    Allergies  No known drug allergies.    Family History  Family history was reviewed with the patient. Family history of liver disease in her mother.     Social History  Social history was reviewed with the patient. She denies tobacco, alcohol, and recreational drug use. She is currently sexually active with one partner.      Review of Systems   Constitutional: Positive for fever.   HENT: Positive for rhinorrhea and sore throat. Negative for ear pain.    Eyes: Positive for discharge (clear).   Respiratory: Positive for shortness of breath. Negative for cough.    Cardiovascular: Negative for chest pain.   Gastrointestinal: Positive for abdominal pain and nausea. Negative for blood in stool, constipation, diarrhea and vomiting.   Genitourinary: Negative for difficulty urinating, dysuria, flank pain, hematuria and vaginal discharge.   Skin: Negative for rash.   Neurological: Negative for dizziness.   All other systems reviewed and are negative.    A complete review of systems was performed with pertinent positives and negatives noted in the HPI, and all other systems negative.    Physical Exam   BP: 127/77  Pulse: 76  Temp: 97.8  F (36.6  C)  Resp: 16  Weight: 122.9 kg (271 lb)  SpO2: 98 %  Physical Exam  Vitals and nursing note reviewed.   Constitutional:       General: She is not in acute distress.     Appearance: She is well-developed.   HENT:      Head: Normocephalic and atraumatic.      Mouth/Throat:      Mouth: Mucous membranes are moist.      Pharynx: Oropharynx is clear. No oropharyngeal exudate.   Eyes:      Extraocular Movements: Extraocular movements intact.      Pupils: Pupils are equal, round, and reactive to light.   Cardiovascular:      Rate and Rhythm: Normal rate and regular rhythm.      Heart sounds: No murmur heard.  Pulmonary:       Effort: Pulmonary effort is normal.      Breath sounds: Normal breath sounds. No wheezing.   Abdominal:      General: Bowel sounds are normal.      Palpations: Abdomen is soft.      Tenderness: There is abdominal tenderness in the epigastric area and suprapubic area. There is no guarding or rebound.   Skin:     General: Skin is warm and dry.      Capillary Refill: Capillary refill takes less than 2 seconds.      Findings: No rash.   Neurological:      General: No focal deficit present.      Mental Status: She is alert.   Psychiatric:         Mood and Affect: Mood normal.         Behavior: Behavior normal.       ED Course     Labs:   CBC: WNL. WBC 7.5, HGB 11.9,   CMP: Cr 0.51, Glucose 110, AST/ALT WNL, ALK Phos WNL  Lipase: WNL (80)  Troponin: WNL (4)  UA: Few bacteria, mucus, and 2 squamous epithelial cells otherwise negative  RSV: Positive   COVID/flu: Negative    Imaging:  EKG: Normal sinus rhythm. May have biphasic T-wave. No comparison available. No acute ST changes.     Interventions:  Medications   ondansetron (ZOFRAN) injection 4 mg (4 mg Intravenous Given 10/22/22 0919)   0.9% sodium chloride BOLUS (0 mLs Intravenous Stopped 10/22/22 1039)   lidocaine (viscous) (XYLOCAINE) 2 % 15 mL, alum & mag hydroxide-simethicone (MAALOX) 15 mL GI Cocktail (30 mLs Oral Given 10/22/22 0920)        Assessments & Plan (with Medical Decision Making)     Sadi Bedoya is a 47 year old female PMH schizoaffective disorder s/p cholecystectomy and hysterectomy who presents for evaluation for abdominal pain and URI symptoms. She reports an 8-day history of episodic epigastric abdominal pain radiating to left chest with associated nausea worsened with food and movement, as well as an 8-day history of URI symptoms including subjective fever, intermittent generalized headache, mild sore throat, and rhinorrhea. No vomiting, diarrhea, bloody stools. No NSAID/alcohol use. Afebrile with normal vitals on admissions. Exam with  epigastric and suprapubic tenderness to palpation, no rebound/guarding and otherwise soft/non-distended. Workup significant for RSV positive. CBC, CMP, lipase, troponin, UA, COVID/flu otherwise reassuring. EKG normal sinus rhythm without acute ST changes. She was given NS 1L, IV Zofran, and GI Cocktail for her symptoms.     On recheck, the patient was feeling improved after interventions. Suspect URI symptoms are secondary to RSV- low suspicion for superimposed bacterial infection as patient is afebrile with no leukocytosis, do not feel CXR warranted at this time. Epigastric abdominal pain likely secondary to GERD given association with food and improvement with GI cocktail. Less likely gastritis without significant NSAID/alcohol use. Less likely pancreatitis/hepatitis/duct stone with normal CMP and lipase. EKG and negative troponin reassuring against cardiac etiology. PERC negative, making PE unlikely. UA without signs of UTI/pyelonephritis/nephrolithiasis.     Patient is feeling improved, HDS and tolerating oral intake. Benign abdominal exam without concern for acute abdomen. She is appropriate for outpatient management. Discussed supportive management of RSV including rest, fluids, and Tylenol as needed. Will discharge with prescription for PPI to trial improvement of abdominal pain. Recommended follow up with PCP in one week to recheck abdominal pain and determine need for further workup. The patient voiced understanding and agreement with this plan. Questions were answered.     I have reviewed the nursing notes. I have reviewed the findings, diagnosis, plan and need for follow up with the patient.    New Prescriptions    OMEPRAZOLE (PRILOSEC) 20 MG DR CAPSULE    Take 1 capsule (20 mg) by mouth daily     Final diagnoses:   RSV bronchiolitis   Gastroesophageal reflux disease without esophagitis   Abdominal pain, epigastric     Felicita Moore MD  [facility], PGY-1    ----    ED Attending Physician  Attestation    I Raoul Rios MD, cared for this patient with the Resident. I have performed a history and physical examination of the patient and discussed management with the resident. I reviewed the resident's documentation above and agree with the documented findings and plan of care.    Summary of HPI, PE, ED Course   Patient is a 47 year old female evaluated in the emergency department for epigastric pain radiating into her chest in conjunction with 8-day history of URI symptoms including fever, sore throat, cough and rhinorrhea. Exam notable for epigastric and left upper quadrant tenderness without peritoneal signs, lungs clear, patient in no respiratory distress, upper airway normal, remainder of complete physical exam normal. ED course notable for patient treated symptomatically, serial abdominal exam benign, tolerating p.o, labs reveal that she is positive for RSV which fits clinically with her respiratory symptoms.  Her abdominal exam is benign and clinically her symptoms are most consistent with GERD, no exam or diagnostic studies to support life-threatening etiologies such as atypical presentation of ACS, patient is PERC negative, has already had a cholecystectomy no obstructive pattern of liver enzymes. After the completion of care in the emergency department, the patient was discharged.    Critical Care & Procedures  Not applicable.    Medical Decision Making  The medical record was reviewed and interpreted.  Current labs reviewed and interpreted.  Previous labs reviewed and interpreted.  Managed outpatient prescription medications.      Raoul Rios MD  Emergency Medicine       Raoul Rios MD  [facility] EMERGENCY DEPARTMENT  10/22/2022     Raoul Rios MD  10/22/22 1212

## 2022-10-22 NOTE — DISCHARGE INSTRUCTIONS
You were seen for abdominal pain and symptoms including headache, running nose, and sore throat. You tested positive for a virus called RSV, which is likely contributing to your symptoms. Your abdominal pain may also be due to acid reflux. Other testing looking for bacterial infection, kidney function, pancreas function, liver function, COVID, and flu testing were all normal and reassuring. Please follow the plan below at discharge:    - Supportive care for RSV virus with rest, fluids, Tylenol as needed   - Start Omeprazole 1 tablet daily for acid reflux   - Follow up with your primary doctor in one week for recheck of your symptoms

## 2022-10-22 NOTE — ED TRIAGE NOTES
Patient states she went to the doctor 8 days ago and they diagnosed her with a sinus infection. She took an antibiotic for 5 days and is still feeling ill. She complains of nasal congestion and frontal sinus headache and fever. She is also complaining of epigastric pain and nausea. She has not vomited but reports decreased appetite.      Triage Assessment     Row Name 10/22/22 0754       Triage Assessment (Adult)    Airway WDL WDL       Respiratory WDL    Respiratory WDL WDL       Skin Circulation/Temperature WDL    Skin Circulation/Temperature WDL WDL       Cardiac WDL    Cardiac WDL WDL       Peripheral/Neurovascular WDL    Peripheral Neurovascular WDL WDL       Cognitive/Neuro/Behavioral WDL    Cognitive/Neuro/Behavioral WDL WDL

## 2022-10-24 LAB
ATRIAL RATE - MUSE: 68 BPM
DIASTOLIC BLOOD PRESSURE - MUSE: NORMAL MMHG
INTERPRETATION ECG - MUSE: NORMAL
P AXIS - MUSE: 45 DEGREES
PR INTERVAL - MUSE: 148 MS
QRS DURATION - MUSE: 76 MS
QT - MUSE: 382 MS
QTC - MUSE: 406 MS
R AXIS - MUSE: 63 DEGREES
SYSTOLIC BLOOD PRESSURE - MUSE: NORMAL MMHG
T AXIS - MUSE: 27 DEGREES
VENTRICULAR RATE- MUSE: 68 BPM

## 2022-10-27 NOTE — PLAN OF CARE
POD#2 s/p of Exploratory laparotomy, right salpingo-oophorectomy, appendectomy, staging biopsies, omentectomy. A&Ox4. VSS on room air. Denies pain throughout shift. Patient refused her evening and overnight medicine, stating that she doesn't want to be waken up and would prefer to get some rest tonight. Up independently to the bathroom voiding but not saving. Denies passing gas, no post op BM. Patient is [place] speaking,  Ipad available. On regular diet, denies nausea. Midline incision with primapore dressing open to air.  PIV saline locked. Calls appropriately.Continue with plan of care.    Doxycycline Counseling:  Patient counseled regarding possible photosensitivity and increased risk for sunburn.  Patient instructed to avoid sunlight, if possible.  When exposed to sunlight, patients should wear protective clothing, sunglasses, and sunscreen.  The patient was instructed to call the office immediately if the following severe adverse effects occur:  hearing changes, easy bruising/bleeding, severe headache, or vision changes.  The patient verbalized understanding of the proper use and possible adverse effects of doxycycline.  All of the patient's questions and concerns were addressed.

## 2023-03-26 ENCOUNTER — HOSPITAL ENCOUNTER (EMERGENCY)
Facility: CLINIC | Age: 48
Discharge: HOME HEALTH | End: 2023-03-27
Attending: EMERGENCY MEDICINE | Admitting: EMERGENCY MEDICINE
Payer: COMMERCIAL

## 2023-03-26 VITALS
BODY MASS INDEX: 43.39 KG/M2 | DIASTOLIC BLOOD PRESSURE: 71 MMHG | TEMPERATURE: 97.8 F | HEART RATE: 74 BPM | RESPIRATION RATE: 16 BRPM | WEIGHT: 270 LBS | SYSTOLIC BLOOD PRESSURE: 101 MMHG | OXYGEN SATURATION: 100 % | HEIGHT: 66 IN

## 2023-03-26 DIAGNOSIS — M54.50 ACUTE LEFT-SIDED LOW BACK PAIN WITHOUT SCIATICA: ICD-10-CM

## 2023-03-26 PROCEDURE — 99283 EMERGENCY DEPT VISIT LOW MDM: CPT | Performed by: EMERGENCY MEDICINE

## 2023-03-26 PROCEDURE — 99284 EMERGENCY DEPT VISIT MOD MDM: CPT | Mod: 25 | Performed by: EMERGENCY MEDICINE

## 2023-03-27 ENCOUNTER — APPOINTMENT (OUTPATIENT)
Dept: CT IMAGING | Facility: CLINIC | Age: 48
End: 2023-03-27
Attending: EMERGENCY MEDICINE
Payer: COMMERCIAL

## 2023-03-27 PROCEDURE — 72131 CT LUMBAR SPINE W/O DYE: CPT

## 2023-03-27 PROCEDURE — 96372 THER/PROPH/DIAG INJ SC/IM: CPT | Performed by: EMERGENCY MEDICINE

## 2023-03-27 PROCEDURE — 250N000011 HC RX IP 250 OP 636: Performed by: EMERGENCY MEDICINE

## 2023-03-27 RX ORDER — IBUPROFEN 600 MG/1
600 TABLET, FILM COATED ORAL EVERY 6 HOURS
Qty: 28 TABLET | Refills: 0 | Status: SHIPPED | OUTPATIENT
Start: 2023-03-27 | End: 2023-04-03

## 2023-03-27 RX ORDER — ACETAMINOPHEN 500 MG
1000 TABLET ORAL EVERY 6 HOURS
Qty: 56 TABLET | Refills: 0 | Status: SHIPPED | OUTPATIENT
Start: 2023-03-27 | End: 2023-04-03

## 2023-03-27 RX ORDER — KETOROLAC TROMETHAMINE 15 MG/ML
15 INJECTION, SOLUTION INTRAMUSCULAR; INTRAVENOUS ONCE
Status: COMPLETED | OUTPATIENT
Start: 2023-03-27 | End: 2023-03-27

## 2023-03-27 RX ADMIN — KETOROLAC TROMETHAMINE 15 MG: 15 INJECTION, SOLUTION INTRAMUSCULAR; INTRAVENOUS at 01:46

## 2023-03-27 ASSESSMENT — ACTIVITIES OF DAILY LIVING (ADL)
ADLS_ACUITY_SCORE: 37
ADLS_ACUITY_SCORE: 37

## 2023-03-27 NOTE — ED NOTES
Patient insists she is not pregnant would like to wait until pain medication takes effect before attempting to get up to use the restroom or commode for hcg POCT.

## 2023-03-27 NOTE — DISCHARGE INSTRUCTIONS
Thank you for your patience today.  Please follow-up with your regular doctor or in one of our clinics in the next 2-3 days for further evaluation and follow-up care.  Please call to schedule an appointment.  [facility] [phone] or [facility] [phone]. Please continue your own medications.      Please take ibuprofen 600 mg every 6 hours as needed for pain.  You may also take Tylenol 1000 mg every 6 hours as needed for pain.  Please alternate these so you take something every 3 hours.      Please return to the ER if you develop any worsening of your current symptoms.  It was a pleasure taking care of you today.  We hope you feel better soon.

## 2023-03-27 NOTE — ED PROVIDER NOTES
"    [facility] EMERGENCY DEPARTMENT ([facility])     March 27, 2023  ED Provider Note  [facility]      History     Chief Complaint   Patient presents with     Fall     Pt fell 2 days ago, landed on her back, denies hitting head, denies LOC. Is now having lower back pain, pain shoots down to the L leg \"especially my L lower back\" Denies changes B/B     HPI  Sadi Bedoya is a 48 year old female with a past medical history significant for schizoaffective disorder who presents to the Emergency Department for evaluation of fall. Patient fell in the bathroom two days ago and landed on her left side and back. She states that now she is experiencing lower back pain that radiates down to her thighs. She reports more pain with walking. She states she took Tylenol for pain. Denies losing consciousness or hitting her head. Patient is not on chronic anticoagulation. Denies incontinence, difficulty urinating, weakness, or numbness. Denies any other pain or injuries.      Past Medical History  Past Medical History:   Diagnosis Date     Obesity, Class III, BMI 40-49.9 (morbid obesity) (H)      Ovarian cystic mass      Schizoaffective disorder, bipolar type (H)      Past Surgical History:   Procedure Laterality Date     CHOLECYSTECTOMY       HYSTERECTOMY TOTAL ABDOMINAL, BILATERAL SALPINGO-OOPHORECTOMY, NODE DISSECTION, COMBINED N/A 9/16/2020    Procedure: Exploratory laparotomy, right salpingo-oophorectomy, appendectomy, staging biopsies, omentectomy;  Surgeon: Aisha Solis MD;  Location:  OR     OLANZapine (ZYPREXA) 5 MG tablet  traZODone (DESYREL) 150 MG tablet  acetaminophen (TYLENOL) 325 MG tablet  famotidine (PEPCID) 40 MG tablet  ibuprofen (ADVIL/MOTRIN) 400 MG tablet  omeprazole (PRILOSEC) 20 MG DR capsule  ondansetron (ZOFRAN ODT) 4 MG ODT tab  order for DME  order for DME  oxyCODONE (ROXICODONE) 5 MG tablet  Prenatal Vit-Fe Fumarate-FA (PRENATAL MULTIVITAMIN PLUS IRON) 27-0.8 MG " "TABS per tablet  risperiDONE (RISPERDAL) 2 MG tablet  senna-docusate (SENOKOT-S/PERICOLACE) 8.6-50 MG tablet  vitamin D (ERGOCALCIFEROL) 30277 UNIT capsule  Vitamin D, Cholecalciferol, 25 MCG (1000 UT) CAPS      Allergies   Allergen Reactions     Alcohol [Ethanol]      Gelatin      No Known Drug Allergy      Pork Allergy      Family History  Family History   Problem Relation Age of Onset     Liver Disease Mother      Social History   Social History     Tobacco Use     Smoking status: Never     Smokeless tobacco: Never   Substance Use Topics     Alcohol use: No     Drug use: No      Past medical history, past surgical history, medications, allergies, family history, and social history were reviewed with the patient. No additional pertinent items.      A complete review of systems was performed with pertinent positives and negatives noted in the HPI, and all other systems negative.    Physical Exam   BP: 101/71  Pulse: 74  Temp: 97.8  F (36.6  C)  Resp: 16  Height: 167.6 cm (5' 6\")  Weight: 122.5 kg (270 lb)  SpO2: 100 %  Physical Exam  General: Afebrile, no acute distress   HEENT: Normocephalic, atraumatic, conjunctivae normal. MMM  Neck: non-tender, supple  Cardio: regular rate. regular rhythm   Resp: Normal work of breathing, no respiratory distress, lungs clear bilaterally, no wheezing, rhonchi, rales  Chest/Back: no visual signs of trauma,  no step offs or deformities, +TTP left midline lumbar region, left SI, left hip, left buttock, no CVA tenderness   Abdomen: soft, non distension, no tenderness, no peritoneal signs   Neuro: alert and fully oriented. CN II-XII grossly intact. Normal strength and sensation in all extremities. No saddle parestesias  MSK: no deformities. Normal range of motion  Integumentary/Skin: no rash visualized, normal color  Psych: normal affect, normal behavior      ED Course, Procedures, & Data      Procedures       Results for orders placed or performed during the hospital encounter of " 03/26/23   Lumbar spine CT w/o contrast     Status: None    Narrative    EXAM: CT LUMBAR SPINE W/O CONTRAST  LOCATION: [facility]  DATE/TIME: 3/27/2023 2:44 AM    INDICATION: trauma, fall; Low back pain; Trauma and or suspected fracture; Mild moderate trauma   COMPARISON: None.  TECHNIQUE: Routine CT Lumbar Spine without IV contrast. Multiplanar reformats. Dose reduction techniques were used.     FINDINGS:  VERTEBRA:   Bilateral L5 chronic pars fractures. Grade 2 L5-S1 anterolisthesis measuring 1.0 cm. L5-S1 bilateral severe high-grade neural foraminal stenosis with compression of the exiting bilateral L5 nerve roots.    No acute fracture. No acute post traumatic subluxation. Vertebral body heights within normal limits.     CANAL/FORAMINA: Mild multilevel spondylosis worse at L5-S1. No significant central canal stenosis.  Remaining levels demonstrate various levels and degrees of neural foraminal stenosis with no severe high-grade neural foraminal stenosis. No additional   subluxations. Thoracolumbar mild dextroscoliosis.    L3-L4 solid interbody fusion.    Right SI degenerative joint disease. Left SI demonstrates irregularity may reflect asymmetrically more pronounced degenerative joint disease or sacroiliitis. Please correlate clinically.    PARASPINAL: No significant extraspinal abnormality.      Impression     IMPRESSION:  1.  No acute fracture.  2.  Bilateral L5 chronic pars fractures. Grade 2 L5-S1 anterolisthesis .    3.  Degenerative changes described above.  4.  Left SI demonstrates irregularity may reflect asymmetrically more pronounced degenerative joint disease or sacroiliitis. Please correlate clinically.       Medications   ketorolac (TORADOL) injection 15 mg (15 mg Intramuscular $Given 3/27/23 0146)     Labs Ordered and Resulted from Time of ED Arrival to Time of ED Departure - No data to display  Lumbar spine CT w/o contrast   Final Result    IMPRESSION:    1.  No acute fracture.   2.  Bilateral L5 chronic pars fractures. Grade 2 L5-S1 anterolisthesis .     3.  Degenerative changes described above.   4.  Left SI demonstrates irregularity may reflect asymmetrically more pronounced degenerative joint disease or sacroiliitis. Please correlate clinically.                Critical care was not performed.     Medical Decision Making  The patient's presentation was of moderate complexity (an acute complicated injury).    The patient's evaluation involved:  an assessment requiring an independent historian (niece)  ordering and/or review of 1 test(s) in this encounter (see separate area of note for details)    The patient's management necessitated only low risk treatment.      Assessment & Plan    Sadi Bedoya is a 48 year old female with a past medical history significant for schizoaffective disorder who presents to the Emergency Department for evaluation of fall 2 days ago, now with left low back, hip, buttocks pain.  Upon arrival patient is nontoxic-appearing, afebrile, mild distress secondary to pain.  Patient reports pain worse with movement.  Denies any other injuries or complaints, patient is not on chronic anticoagulation.  No focal neurological deficits.  Differential diagnosis includes but is not limited to contusion versus fracture versus lumbar herniation among others.  Patient was treated with IM Toradol in the emergency department a CT of her lumbar spine was performed. (aquiles declined pregnancy test prior to CT, insisted that she was not pregnant).  I reviewed CT scan which demonstrates no acute fracture, bilateral L5 chronic pars fractures.  Grade 2 L5-S1 anterior listhesis.  Degenerative changes, left SI with irregularity.  Given recent trauma and pain, could be suggestive of sacroiliitis.  I discussed results with patient, family at this time recommend supportive care with Tylenol, ibuprofen, close outpatient follow-up with her primary care provider.   Weightbearing as tolerated.  And return precautions discussed.  Patient understands and agrees with plan.    I have reviewed the nursing notes. I have reviewed the findings, diagnosis, plan and need for follow up with the patient.    New Prescriptions    No medications on file       Final diagnoses:   Acute left-sided low back pain without sciatica   I, Yamileth Thomson, am serving as a trained medical scribe to document services personally performed by Stacie Kahn MD, based on the provider's statements to me.      I, Stacie Kahn MD, was physically present and have reviewed and verified the accuracy of this note documented by Yamileth Thomson.    Stacie Kahn MD  [facility] EMERGENCY DEPARTMENT  3/26/2023     Stacie Kahn MD  03/27/23 0344

## 2023-03-27 NOTE — ED NOTES
Patient verbalized understanding of CT risks to fetus if pregnant and reports she is not sexually active. She gave consent for the CT and verbalized understanding of the potential risks. Dr. Kahn notified and gave OK for CT.

## 2023-03-27 NOTE — ED TRIAGE NOTES
"  Pt fell 2 days ago, landed on her back, denies hitting head, denies LOC. Is now having lower back pain, pain shoots down to the L leg \"especially my L lower back\" Denies changes B/B   Triage Assessment     Row Name 03/26/23 7087       Triage Assessment (Adult)    Airway WDL WDL       Respiratory WDL    Respiratory WDL WDL       Skin Circulation/Temperature WDL    Skin Circulation/Temperature WDL WDL       Cardiac WDL    Cardiac WDL WDL       Peripheral/Neurovascular WDL    Peripheral Neurovascular WDL WDL       Cognitive/Neuro/Behavioral WDL    Cognitive/Neuro/Behavioral WDL WDL              "

## (undated) DEVICE — SU SILK 3-0 SH CR 8X18" C013D

## (undated) DEVICE — SU VICRYL 0 TIE 54" J608H

## (undated) DEVICE — SU VICRYL 2-0 CT-2 27" J333H

## (undated) DEVICE — PREP POVIDONE IODINE SCRUB 7.5% 4OZ APL82212

## (undated) DEVICE — STRAP UNIVERSAL POSITIONING 2-PIECE 4X47X76" 91-287

## (undated) DEVICE — SU PDS II 0 TP-1 60" Z991G

## (undated) DEVICE — GLOVE PROTEXIS W/NEU-THERA 6.0  2D73TE60

## (undated) DEVICE — SU MONOCRYL 3-0 PS-1 27" Y936H

## (undated) DEVICE — SOL WATER IRRIG 1000ML BOTTLE 2F7114

## (undated) DEVICE — WIPES FOLEY CARE SURESTEP PROVON DFC100

## (undated) DEVICE — LINEN TOWEL PACK X30 5481

## (undated) DEVICE — DRAPE LEGGINGS CLEAR 8430

## (undated) DEVICE — SURGICEL HEMOSTAT 4X8" 1952

## (undated) DEVICE — LINEN TOWEL PACK X6 WHITE 5487

## (undated) DEVICE — PACK AB HYST II

## (undated) DEVICE — ESU LIGASURE IMPACT OPEN SEALER/DVDR CVD LG JAW LF4418

## (undated) DEVICE — PREP POVIDONE IODINE SOLUTION 10% 4OZ

## (undated) DEVICE — PREP CHLORAPREP 26ML TINTED ORANGE  260815

## (undated) DEVICE — SOL NACL 0.9% IRRIG 1000ML BOTTLE 2F7124

## (undated) DEVICE — SU VICRYL 0 CT-1 CR 8X27" UND JJ41G

## (undated) DEVICE — SU VICRYL 2-0 SH 27" UND J417H

## (undated) DEVICE — SU SILK 2-0 TIE 12X30" A305H

## (undated) DEVICE — SUCTION MANIFOLD DORNOCH ULTRA CART UL-CL500

## (undated) DEVICE — SU SILK 0 TIE 6X30" A306H

## (undated) DEVICE — GLOVE PROTEXIS BLUE W/NEU-THERA 6.0  2D73EB60

## (undated) RX ORDER — PROPOFOL 10 MG/ML
INJECTION, EMULSION INTRAVENOUS
Status: DISPENSED
Start: 2020-09-16

## (undated) RX ORDER — DEXAMETHASONE SODIUM PHOSPHATE 4 MG/ML
INJECTION, SOLUTION INTRA-ARTICULAR; INTRALESIONAL; INTRAMUSCULAR; INTRAVENOUS; SOFT TISSUE
Status: DISPENSED
Start: 2020-09-16

## (undated) RX ORDER — ONDANSETRON 2 MG/ML
INJECTION INTRAMUSCULAR; INTRAVENOUS
Status: DISPENSED
Start: 2020-09-16

## (undated) RX ORDER — IPRATROPIUM BROMIDE AND ALBUTEROL SULFATE 2.5; .5 MG/3ML; MG/3ML
SOLUTION RESPIRATORY (INHALATION)
Status: DISPENSED
Start: 2020-09-16

## (undated) RX ORDER — CEFAZOLIN SODIUM 2 G/100ML
INJECTION, SOLUTION INTRAVENOUS
Status: DISPENSED
Start: 2020-09-16

## (undated) RX ORDER — GLYCOPYRROLATE 0.2 MG/ML
INJECTION, SOLUTION INTRAMUSCULAR; INTRAVENOUS
Status: DISPENSED
Start: 2020-09-16

## (undated) RX ORDER — EPHEDRINE SULFATE 50 MG/ML
INJECTION, SOLUTION INTRAMUSCULAR; INTRAVENOUS; SUBCUTANEOUS
Status: DISPENSED
Start: 2020-09-16

## (undated) RX ORDER — FENTANYL CITRATE-0.9 % NACL/PF 10 MCG/ML
PLASTIC BAG, INJECTION (ML) INTRAVENOUS
Status: DISPENSED
Start: 2020-09-16

## (undated) RX ORDER — FENTANYL CITRATE 50 UG/ML
INJECTION, SOLUTION INTRAMUSCULAR; INTRAVENOUS
Status: DISPENSED
Start: 2020-09-16

## (undated) RX ORDER — PHENAZOPYRIDINE HYDROCHLORIDE 200 MG/1
TABLET, FILM COATED ORAL
Status: DISPENSED
Start: 2020-09-16

## (undated) RX ORDER — SODIUM CHLORIDE 9 MG/ML
INJECTION, SOLUTION INTRAVENOUS
Status: DISPENSED
Start: 2020-09-16

## (undated) RX ORDER — ALBUTEROL SULFATE 90 UG/1
AEROSOL, METERED RESPIRATORY (INHALATION)
Status: DISPENSED
Start: 2020-09-16